# Patient Record
Sex: MALE | Race: WHITE | Employment: FULL TIME | URBAN - METROPOLITAN AREA
[De-identification: names, ages, dates, MRNs, and addresses within clinical notes are randomized per-mention and may not be internally consistent; named-entity substitution may affect disease eponyms.]

---

## 2019-03-24 ENCOUNTER — HOSPITAL ENCOUNTER (INPATIENT)
Age: 39
LOS: 1 days | Discharge: HOME | DRG: 282 | End: 2019-03-26
Attending: EMERGENCY MEDICINE | Admitting: INTERNAL MEDICINE
Payer: COMMERCIAL

## 2019-03-24 ENCOUNTER — APPOINTMENT (OUTPATIENT)
Dept: CT IMAGING | Age: 39
DRG: 282 | End: 2019-03-24
Attending: EMERGENCY MEDICINE
Payer: COMMERCIAL

## 2019-03-24 ENCOUNTER — APPOINTMENT (OUTPATIENT)
Dept: GENERAL RADIOLOGY | Age: 39
DRG: 282 | End: 2019-03-24
Attending: EMERGENCY MEDICINE
Payer: COMMERCIAL

## 2019-03-24 ENCOUNTER — APPOINTMENT (OUTPATIENT)
Dept: ULTRASOUND IMAGING | Age: 39
DRG: 282 | End: 2019-03-24
Attending: INTERNAL MEDICINE
Payer: COMMERCIAL

## 2019-03-24 DIAGNOSIS — R07.9 CHEST PAIN, UNSPECIFIED TYPE: Primary | ICD-10-CM

## 2019-03-24 DIAGNOSIS — I21.4 NSTEMI (NON-ST ELEVATED MYOCARDIAL INFARCTION) (HCC): ICD-10-CM

## 2019-03-24 DIAGNOSIS — R77.8 ELEVATED TROPONIN I LEVEL: ICD-10-CM

## 2019-03-24 LAB
ALBUMIN SERPL-MCNC: 3.7 G/DL (ref 3.5–5)
ALBUMIN/GLOB SERPL: 0.8 {RATIO} (ref 1.1–2.2)
ALP SERPL-CCNC: 79 U/L (ref 45–117)
ALT SERPL-CCNC: 96 U/L (ref 12–78)
AMPHET UR QL SCN: NEGATIVE
ANION GAP SERPL CALC-SCNC: 8 MMOL/L (ref 5–15)
APTT PPP: 27.7 SEC (ref 22.1–32)
AST SERPL-CCNC: 46 U/L (ref 15–37)
ATRIAL RATE: 89 BPM
BARBITURATES UR QL SCN: NEGATIVE
BASOPHILS # BLD: 0 K/UL (ref 0–0.1)
BASOPHILS NFR BLD: 0 % (ref 0–1)
BENZODIAZ UR QL: NEGATIVE
BILIRUB DIRECT SERPL-MCNC: 0.1 MG/DL (ref 0–0.2)
BILIRUB SERPL-MCNC: 0.9 MG/DL (ref 0.2–1)
BUN SERPL-MCNC: 13 MG/DL (ref 6–20)
BUN/CREAT SERPL: 13 (ref 12–20)
CALCIUM SERPL-MCNC: 9 MG/DL (ref 8.5–10.1)
CALCULATED P AXIS, ECG09: 49 DEGREES
CALCULATED R AXIS, ECG10: 2 DEGREES
CALCULATED T AXIS, ECG11: 39 DEGREES
CANNABINOIDS UR QL SCN: NEGATIVE
CHLORIDE SERPL-SCNC: 100 MMOL/L (ref 97–108)
CK SERPL-CCNC: 125 U/L (ref 39–308)
CO2 SERPL-SCNC: 29 MMOL/L (ref 21–32)
COCAINE UR QL SCN: NEGATIVE
COMMENT, HOLDF: NORMAL
CREAT SERPL-MCNC: 1.04 MG/DL (ref 0.7–1.3)
D DIMER PPP FEU-MCNC: 0.85 MG/L FEU (ref 0–0.65)
DIAGNOSIS, 93000: NORMAL
DIFFERENTIAL METHOD BLD: NORMAL
DRUG SCRN COMMENT,DRGCM: NORMAL
EOSINOPHIL # BLD: 0.1 K/UL (ref 0–0.4)
EOSINOPHIL NFR BLD: 1 % (ref 0–7)
ERYTHROCYTE [DISTWIDTH] IN BLOOD BY AUTOMATED COUNT: 12.5 % (ref 11.5–14.5)
GLOBULIN SER CALC-MCNC: 4.6 G/DL (ref 2–4)
GLUCOSE SERPL-MCNC: 101 MG/DL (ref 65–100)
HCT VFR BLD AUTO: 46.6 % (ref 36.6–50.3)
HGB BLD-MCNC: 15.7 G/DL (ref 12.1–17)
IMM GRANULOCYTES # BLD AUTO: 0 K/UL (ref 0–0.04)
IMM GRANULOCYTES NFR BLD AUTO: 0 % (ref 0–0.5)
LIPASE SERPL-CCNC: 172 U/L (ref 73–393)
LYMPHOCYTES # BLD: 2 K/UL (ref 0.8–3.5)
LYMPHOCYTES NFR BLD: 21 % (ref 12–49)
MAGNESIUM SERPL-MCNC: 2 MG/DL (ref 1.6–2.4)
MCH RBC QN AUTO: 31.2 PG (ref 26–34)
MCHC RBC AUTO-ENTMCNC: 33.7 G/DL (ref 30–36.5)
MCV RBC AUTO: 92.6 FL (ref 80–99)
METHADONE UR QL: NEGATIVE
MONOCYTES # BLD: 1 K/UL (ref 0–1)
MONOCYTES NFR BLD: 10 % (ref 5–13)
NEUTS SEG # BLD: 6.3 K/UL (ref 1.8–8)
NEUTS SEG NFR BLD: 68 % (ref 32–75)
NRBC # BLD: 0 K/UL (ref 0–0.01)
NRBC BLD-RTO: 0 PER 100 WBC
OPIATES UR QL: NEGATIVE
P-R INTERVAL, ECG05: 134 MS
PCP UR QL: NEGATIVE
PLATELET # BLD AUTO: 253 K/UL (ref 150–400)
PMV BLD AUTO: 11 FL (ref 8.9–12.9)
POTASSIUM SERPL-SCNC: 4.6 MMOL/L (ref 3.5–5.1)
PROT SERPL-MCNC: 8.3 G/DL (ref 6.4–8.2)
Q-T INTERVAL, ECG07: 362 MS
QRS DURATION, ECG06: 82 MS
QTC CALCULATION (BEZET), ECG08: 440 MS
RBC # BLD AUTO: 5.03 M/UL (ref 4.1–5.7)
SAMPLES BEING HELD,HOLD: NORMAL
SODIUM SERPL-SCNC: 137 MMOL/L (ref 136–145)
T4 FREE SERPL-MCNC: 0.9 NG/DL (ref 0.8–1.5)
THERAPEUTIC RANGE,PTTT: NORMAL SECS (ref 58–77)
TROPONIN I SERPL-MCNC: 0.06 NG/ML
TROPONIN I SERPL-MCNC: 1.07 NG/ML
TSH SERPL DL<=0.05 MIU/L-ACNC: 0.91 UIU/ML (ref 0.36–3.74)
UR CULT HOLD, URHOLD: NORMAL
VENTRICULAR RATE, ECG03: 89 BPM
WBC # BLD AUTO: 9.4 K/UL (ref 4.1–11.1)

## 2019-03-24 PROCEDURE — 74011000250 HC RX REV CODE- 250: Performed by: SPECIALIST

## 2019-03-24 PROCEDURE — 74011636320 HC RX REV CODE- 636/320: Performed by: EMERGENCY MEDICINE

## 2019-03-24 PROCEDURE — 99218 HC RM OBSERVATION: CPT

## 2019-03-24 PROCEDURE — 83690 ASSAY OF LIPASE: CPT

## 2019-03-24 PROCEDURE — 74011250637 HC RX REV CODE- 250/637: Performed by: INTERNAL MEDICINE

## 2019-03-24 PROCEDURE — 84484 ASSAY OF TROPONIN QUANT: CPT

## 2019-03-24 PROCEDURE — 76705 ECHO EXAM OF ABDOMEN: CPT

## 2019-03-24 PROCEDURE — 93005 ELECTROCARDIOGRAM TRACING: CPT

## 2019-03-24 PROCEDURE — 71275 CT ANGIOGRAPHY CHEST: CPT

## 2019-03-24 PROCEDURE — 83735 ASSAY OF MAGNESIUM: CPT

## 2019-03-24 PROCEDURE — 74011250636 HC RX REV CODE- 250/636: Performed by: EMERGENCY MEDICINE

## 2019-03-24 PROCEDURE — 80048 BASIC METABOLIC PNL TOTAL CA: CPT

## 2019-03-24 PROCEDURE — 85379 FIBRIN DEGRADATION QUANT: CPT

## 2019-03-24 PROCEDURE — 96374 THER/PROPH/DIAG INJ IV PUSH: CPT

## 2019-03-24 PROCEDURE — 74011000250 HC RX REV CODE- 250: Performed by: EMERGENCY MEDICINE

## 2019-03-24 PROCEDURE — 71046 X-RAY EXAM CHEST 2 VIEWS: CPT

## 2019-03-24 PROCEDURE — 74011250636 HC RX REV CODE- 250/636: Performed by: SPECIALIST

## 2019-03-24 PROCEDURE — 84443 ASSAY THYROID STIM HORMONE: CPT

## 2019-03-24 PROCEDURE — 99284 EMERGENCY DEPT VISIT MOD MDM: CPT

## 2019-03-24 PROCEDURE — 82550 ASSAY OF CK (CPK): CPT

## 2019-03-24 PROCEDURE — 80307 DRUG TEST PRSMV CHEM ANLYZR: CPT

## 2019-03-24 PROCEDURE — 85730 THROMBOPLASTIN TIME PARTIAL: CPT

## 2019-03-24 PROCEDURE — 84439 ASSAY OF FREE THYROXINE: CPT

## 2019-03-24 PROCEDURE — 94762 N-INVAS EAR/PLS OXIMTRY CONT: CPT

## 2019-03-24 PROCEDURE — 96375 TX/PRO/DX INJ NEW DRUG ADDON: CPT

## 2019-03-24 PROCEDURE — 74011250637 HC RX REV CODE- 250/637: Performed by: EMERGENCY MEDICINE

## 2019-03-24 PROCEDURE — 96361 HYDRATE IV INFUSION ADD-ON: CPT

## 2019-03-24 PROCEDURE — 74011250637 HC RX REV CODE- 250/637: Performed by: SPECIALIST

## 2019-03-24 PROCEDURE — 85025 COMPLETE CBC W/AUTO DIFF WBC: CPT

## 2019-03-24 PROCEDURE — 36415 COLL VENOUS BLD VENIPUNCTURE: CPT

## 2019-03-24 PROCEDURE — 80076 HEPATIC FUNCTION PANEL: CPT

## 2019-03-24 PROCEDURE — 74011250636 HC RX REV CODE- 250/636: Performed by: INTERNAL MEDICINE

## 2019-03-24 RX ORDER — SODIUM CHLORIDE 9 MG/ML
50 INJECTION, SOLUTION INTRAVENOUS
Status: COMPLETED | OUTPATIENT
Start: 2019-03-24 | End: 2019-03-24

## 2019-03-24 RX ORDER — SODIUM CHLORIDE 0.9 % (FLUSH) 0.9 %
10 SYRINGE (ML) INJECTION
Status: COMPLETED | OUTPATIENT
Start: 2019-03-24 | End: 2019-03-24

## 2019-03-24 RX ORDER — GUAIFENESIN 100 MG/5ML
162 LIQUID (ML) ORAL
Status: COMPLETED | OUTPATIENT
Start: 2019-03-24 | End: 2019-03-24

## 2019-03-24 RX ORDER — ROSUVASTATIN CALCIUM 10 MG/1
20 TABLET, COATED ORAL
Status: DISCONTINUED | OUTPATIENT
Start: 2019-03-24 | End: 2019-03-26

## 2019-03-24 RX ORDER — SODIUM CHLORIDE 0.9 % (FLUSH) 0.9 %
5-40 SYRINGE (ML) INJECTION AS NEEDED
Status: DISCONTINUED | OUTPATIENT
Start: 2019-03-24 | End: 2019-03-26 | Stop reason: HOSPADM

## 2019-03-24 RX ORDER — BISMUTH SUBSALICYLATE 262 MG
1 TABLET,CHEWABLE ORAL DAILY
COMMUNITY

## 2019-03-24 RX ORDER — FOLIC ACID 1 MG/1
1 TABLET ORAL DAILY
Status: DISCONTINUED | OUTPATIENT
Start: 2019-03-25 | End: 2019-03-24 | Stop reason: SDUPTHER

## 2019-03-24 RX ORDER — NITROGLYCERIN 20 MG/100ML
0-200 INJECTION INTRAVENOUS
Status: DISCONTINUED | OUTPATIENT
Start: 2019-03-24 | End: 2019-03-25

## 2019-03-24 RX ORDER — FAMOTIDINE 20 MG/1
20 TABLET, FILM COATED ORAL 2 TIMES DAILY
Status: DISCONTINUED | OUTPATIENT
Start: 2019-03-24 | End: 2019-03-26 | Stop reason: HOSPADM

## 2019-03-24 RX ORDER — METOPROLOL TARTRATE 25 MG/1
25 TABLET, FILM COATED ORAL EVERY 6 HOURS
Status: DISCONTINUED | OUTPATIENT
Start: 2019-03-24 | End: 2019-03-26

## 2019-03-24 RX ORDER — HEPARIN SODIUM 10000 [USP'U]/100ML
8-25 INJECTION, SOLUTION INTRAVENOUS
Status: DISCONTINUED | OUTPATIENT
Start: 2019-03-24 | End: 2019-03-25

## 2019-03-24 RX ORDER — RANITIDINE 150 MG/1
150 TABLET, FILM COATED ORAL 2 TIMES DAILY
COMMUNITY
End: 2019-03-26

## 2019-03-24 RX ORDER — NITROGLYCERIN 0.4 MG/1
0.4 TABLET SUBLINGUAL
Status: DISCONTINUED | OUTPATIENT
Start: 2019-03-24 | End: 2019-03-26 | Stop reason: HOSPADM

## 2019-03-24 RX ORDER — LANOLIN ALCOHOL/MO/W.PET/CERES
100 CREAM (GRAM) TOPICAL DAILY
Status: DISCONTINUED | OUTPATIENT
Start: 2019-03-24 | End: 2019-03-26 | Stop reason: HOSPADM

## 2019-03-24 RX ORDER — HEPARIN SODIUM 5000 [USP'U]/ML
4000 INJECTION, SOLUTION INTRAVENOUS; SUBCUTANEOUS ONCE
Status: COMPLETED | OUTPATIENT
Start: 2019-03-24 | End: 2019-03-24

## 2019-03-24 RX ORDER — MORPHINE SULFATE 2 MG/ML
2 INJECTION, SOLUTION INTRAMUSCULAR; INTRAVENOUS
Status: DISCONTINUED | OUTPATIENT
Start: 2019-03-24 | End: 2019-03-26 | Stop reason: HOSPADM

## 2019-03-24 RX ORDER — ONDANSETRON 2 MG/ML
4 INJECTION INTRAMUSCULAR; INTRAVENOUS
Status: DISCONTINUED | OUTPATIENT
Start: 2019-03-24 | End: 2019-03-26 | Stop reason: HOSPADM

## 2019-03-24 RX ORDER — LORAZEPAM 2 MG/ML
2 INJECTION INTRAMUSCULAR
Status: DISCONTINUED | OUTPATIENT
Start: 2019-03-24 | End: 2019-03-26 | Stop reason: HOSPADM

## 2019-03-24 RX ORDER — METOPROLOL TARTRATE 25 MG/1
25 TABLET, FILM COATED ORAL EVERY 12 HOURS
Status: DISCONTINUED | OUTPATIENT
Start: 2019-03-24 | End: 2019-03-24

## 2019-03-24 RX ORDER — KETOROLAC TROMETHAMINE 30 MG/ML
15 INJECTION, SOLUTION INTRAMUSCULAR; INTRAVENOUS
Status: COMPLETED | OUTPATIENT
Start: 2019-03-24 | End: 2019-03-24

## 2019-03-24 RX ORDER — LANOLIN ALCOHOL/MO/W.PET/CERES
100 CREAM (GRAM) TOPICAL DAILY
Status: DISCONTINUED | OUTPATIENT
Start: 2019-03-25 | End: 2019-03-24 | Stop reason: SDUPTHER

## 2019-03-24 RX ORDER — SODIUM CHLORIDE 0.9 % (FLUSH) 0.9 %
5-40 SYRINGE (ML) INJECTION EVERY 8 HOURS
Status: DISCONTINUED | OUTPATIENT
Start: 2019-03-24 | End: 2019-03-26 | Stop reason: HOSPADM

## 2019-03-24 RX ORDER — FOLIC ACID 1 MG/1
1 TABLET ORAL DAILY
Status: DISCONTINUED | OUTPATIENT
Start: 2019-03-24 | End: 2019-03-26 | Stop reason: HOSPADM

## 2019-03-24 RX ORDER — ASPIRIN 325 MG
325 TABLET ORAL DAILY
Status: DISCONTINUED | OUTPATIENT
Start: 2019-03-25 | End: 2019-03-26

## 2019-03-24 RX ORDER — OXYCODONE AND ACETAMINOPHEN 5; 325 MG/1; MG/1
1-2 TABLET ORAL
Status: DISCONTINUED | OUTPATIENT
Start: 2019-03-24 | End: 2019-03-26 | Stop reason: HOSPADM

## 2019-03-24 RX ORDER — ACETAMINOPHEN 325 MG/1
650 TABLET ORAL
Status: DISCONTINUED | OUTPATIENT
Start: 2019-03-24 | End: 2019-03-26 | Stop reason: HOSPADM

## 2019-03-24 RX ADMIN — FAMOTIDINE 20 MG: 20 TABLET ORAL at 18:00

## 2019-03-24 RX ADMIN — ASPIRIN 81 MG CHEWABLE TABLET 162 MG: 81 TABLET CHEWABLE at 11:01

## 2019-03-24 RX ADMIN — OXYCODONE AND ACETAMINOPHEN 1 TABLET: 5; 325 TABLET ORAL at 20:33

## 2019-03-24 RX ADMIN — Medication 10 ML: at 23:21

## 2019-03-24 RX ADMIN — MORPHINE SULFATE 2 MG: 2 INJECTION, SOLUTION INTRAMUSCULAR; INTRAVENOUS at 23:21

## 2019-03-24 RX ADMIN — FAMOTIDINE 20 MG: 10 INJECTION, SOLUTION INTRAVENOUS at 09:55

## 2019-03-24 RX ADMIN — LIDOCAINE HYDROCHLORIDE 40 ML: 20 SOLUTION ORAL; TOPICAL at 09:55

## 2019-03-24 RX ADMIN — IOPAMIDOL 100 ML: 755 INJECTION, SOLUTION INTRAVENOUS at 10:41

## 2019-03-24 RX ADMIN — FAMOTIDINE 20 MG: 20 TABLET ORAL at 14:00

## 2019-03-24 RX ADMIN — ROSUVASTATIN CALCIUM 20 MG: 10 TABLET, FILM COATED ORAL at 23:21

## 2019-03-24 RX ADMIN — NITROGLYCERIN 10 MCG/MIN: 20 INJECTION INTRAVENOUS at 19:35

## 2019-03-24 RX ADMIN — METOPROLOL TARTRATE 25 MG: 25 TABLET ORAL at 23:21

## 2019-03-24 RX ADMIN — HEPARIN SODIUM 4000 UNITS: 5000 INJECTION, SOLUTION INTRAVENOUS; SUBCUTANEOUS at 19:30

## 2019-03-24 RX ADMIN — METOPROLOL TARTRATE 25 MG: 25 TABLET ORAL at 16:00

## 2019-03-24 RX ADMIN — SODIUM CHLORIDE 1000 ML: 900 INJECTION, SOLUTION INTRAVENOUS at 09:52

## 2019-03-24 RX ADMIN — MORPHINE SULFATE 2 MG: 2 INJECTION, SOLUTION INTRAMUSCULAR; INTRAVENOUS at 17:52

## 2019-03-24 RX ADMIN — KETOROLAC TROMETHAMINE 15 MG: 30 INJECTION, SOLUTION INTRAMUSCULAR; INTRAVENOUS at 12:00

## 2019-03-24 RX ADMIN — METOPROLOL TARTRATE 25 MG: 25 TABLET ORAL at 19:39

## 2019-03-24 RX ADMIN — Medication 10 ML: at 10:41

## 2019-03-24 RX ADMIN — SODIUM CHLORIDE 50 ML/HR: 900 INJECTION, SOLUTION INTRAVENOUS at 10:41

## 2019-03-24 RX ADMIN — Medication 10 ML: at 17:52

## 2019-03-24 RX ADMIN — MULTIPLE VITAMINS W/ MINERALS TAB 1 TABLET: TAB at 14:00

## 2019-03-24 RX ADMIN — Medication 100 MG: at 16:00

## 2019-03-24 RX ADMIN — HEPARIN SODIUM AND DEXTROSE 8 UNITS/KG/HR: 10000; 5 INJECTION INTRAVENOUS at 19:21

## 2019-03-24 RX ADMIN — FOLIC ACID 1 MG: 1 TABLET ORAL at 16:00

## 2019-03-24 NOTE — ED PROVIDER NOTES
75-year-old white male presents to the emergency department with chest pain. Patient reports that he's had a long history of acid reflux. He reports that he takes Zantac twice a day. He has had many years of acid reflux. Patient reports that she had a big meal with wine 2 nights ago. He says he had a hard time sleeping afterwards because of chest pain. He felt burning in the center of his chest that radiated to his throat. This seemed to be worse with lying flat. He did not sleep most of the night that night. Yesterday he was having continued pain in the upper abdomen and lower chest. He felt like it was worse when he lies flat. [facility]. He has had one dose of Nexium. He tried TUMS and Maalox yesterday without relief. Patient is here today with continued pain in the upper abdomen and lower chest. Pain is dull in nature and feels like a burning. Pain is different from his reflux pain. Pt feels left chest pain radiating to his neck and worse w/ deep breaths. He says he feels pain in his neck with deep breaths. No vomiting. No lower abdominal pain. No diarrhea. No dysuria or hematuria. No headaches. Patient denies tobacco or alcohol use every day. No previous cardiac history. No long car trips or plane rides. No family history of cardiac disease. History reviewed. No pertinent past medical history. History reviewed. No pertinent surgical history. History reviewed. No pertinent family history. Social History Socioeconomic History  Marital status: Not on file Spouse name: Not on file  Number of children: Not on file  Years of education: Not on file  Highest education level: Not on file Occupational History  Not on file Social Needs  Financial resource strain: Not on file  Food insecurity:  
  Worry: Not on file Inability: Not on file  Transportation needs:  
  Medical: Not on file Non-medical: Not on file Tobacco Use  Smoking status: Not on file Substance and Sexual Activity  Alcohol use: Not on file  Drug use: Not on file  Sexual activity: Not on file Lifestyle  Physical activity:  
  Days per week: Not on file Minutes per session: Not on file  Stress: Not on file Relationships  Social connections:  
  Talks on phone: Not on file Gets together: Not on file Attends Advent service: Not on file Active member of club or organization: Not on file Attends meetings of clubs or organizations: Not on file Relationship status: Not on file  Intimate partner violence:  
  Fear of current or ex partner: Not on file Emotionally abused: Not on file Physically abused: Not on file Forced sexual activity: Not on file Other Topics Concern  Not on file Social History Narrative  Not on file ALLERGIES: Patient has no known allergies. Review of Systems Constitutional: Negative for fever. HENT: Negative for congestion. Eyes: Negative for pain. Respiratory: Negative for cough and shortness of breath. Cardiovascular: Positive for chest pain. Negative for leg swelling. Gastrointestinal: Positive for abdominal pain. Negative for diarrhea and vomiting. Endocrine: Negative for polyuria. Genitourinary: Negative for flank pain. Musculoskeletal: Negative for gait problem, neck pain and neck stiffness. Skin: Negative for color change. Allergic/Immunologic: Negative for immunocompromised state. Neurological: Negative for headaches. Hematological: Does not bruise/bleed easily. Psychiatric/Behavioral: Negative for confusion. All other systems reviewed and are negative. There were no vitals filed for this visit. Physical Exam  
Constitutional: He is oriented to person, place, and time. He appears well-developed and well-nourished. No distress. HENT:  
Head: Normocephalic and atraumatic. Right Ear: External ear normal.  
Left Ear: External ear normal.  
Eyes: Pupils are equal, round, and reactive to light. EOM are normal.  
Neck: Normal range of motion. Neck supple. No JVD present. No tracheal deviation present. Cardiovascular: Normal rate, regular rhythm and normal heart sounds. Exam reveals no gallop and no friction rub. No murmur heard. Pulmonary/Chest: Effort normal and breath sounds normal. No stridor. No respiratory distress. He has no wheezes. He has no rales. Abdominal: Soft. Bowel sounds are normal. He exhibits no distension. There is no tenderness. There is no rebound and no guarding. Musculoskeletal: Normal range of motion. He exhibits no edema or tenderness. Neurological: He is alert and oriented to person, place, and time. He has normal reflexes. No cranial nerve deficit or sensory deficit. He exhibits normal muscle tone. Coordination normal.  
Skin: Skin is warm and dry. No rash noted. He is not diaphoretic. No erythema. Psychiatric: He has a normal mood and affect. His behavior is normal. Judgment and thought content normal.  
Nursing note and vitals reviewed. MDM Number of Diagnoses or Management Options Diagnosis management comments: Patient currently looks well and nontoxic. Differential diagnosis includes ACS, PE, acid reflux. We'll check basic blood work. We'll check EKG. We'll check chest x-ray. We'll give GI cocktail and IV fluids. We'll reassess when testing is completed. Patient agrees. Amount and/or Complexity of Data Reviewed Decide to obtain previous medical records or to obtain history from someone other than the patient: yes Review and summarize past medical records: yes Independent visualization of images, tracings, or specimens: yes Procedures EKG: NSR, HR 89, normal axis, normal intervals, no ST elevations or depressions Memory MD Sathya 
 
 Trop is 0.06 
 
D-dimer is slightly elevated, will obtain chest CT 
 
 Because of elevated Trop, and symptoms which are different from his reflux pain, will admit for further cardiac evalaution 11:05 AM 
CONSULT Hospitalist Dr Urmila Santana was contacted by tiger chelo. He accepts for admission

## 2019-03-24 NOTE — CONSULTS
3/24/2019    Cardiology Consult  Note   [facility]   --------PCP:-Harika Gutierrez MD   -----Subjective:   Marianna Hook is a 34 y.o. male  Consult requested from Hospitalist/Internal Medicine team  for chest pain  Called by nurse Toyin Elizabeth to see pt  He is with his wife who works as nurse at [employer] [facility] [employer] urgent care  Ela Myers denies cardiac hx, takes some GERD med and Zyrtec and sees Maru Teresa MD   Around [phone] am Saturday he felt onset of pain in mid sternum along esophagus to right shoulder  Then 1230 am Sunday today , felt same symptoms, hard to sleep for few hours  Then when awoke , pain in right and left shoulders now , went to [facility]  First troponin was low at 0.06 and another is pending right now  He is to have a abdominal US tonight  Had GI bug 2 weeks ago, no recent flu  ROS:Cardiac as above. Respiratory as above with no wheezing or hemoptysis. ROS negative x as above   He denies  symptoms of unusual weight loss , fevers, night sweats, BRBPR, hematuria, seizure or recent stroke  Denies CVA, HTN, DM2 of XOL  CTA done at [facility] showed NO PE, + minor atelectasis   Social Hx-travels once a week for business, 3 kids, non smoker, no long travel of late  Family Hx-father with CHF in his 62s, no CAD  Discussion/Plan/Impression:    Diffuse pain to shoulders and chest , at first seemed GERD but now constant and seems more musculoskeletal   EKG with NSR and minor lateral STT findings  All very non specific  If enzymes low then stress nuclear and echo in am  If enzymes positive then heparin and cath in am  Discussed fully with patient and wife who is a nurse and familiar with troponin etc  They concur with plan       Lab Results   Component Value Date/Time    Creatinine 1.04 03/24/2019 09:47 AM          Cardiac Studies/Hx:  No specialty comments available. Medical history notable for  has no past medical history on file.   Social history notable for  reports that he has quit smoking. He has never used smokeless tobacco. He reports that he drinks alcohol. He reports that he does not use drugs. Family history notable for family history is not on file. History reviewed. No pertinent past medical history. ROS-pertinents  negative except as above  The pertinent portions of the medical history,physician and nursing notes, meds,vitals , labs and Ins/Outs,are reviewed in the electronic record  Pt reports   CP and the remainder of a complete multisystem 11 ROS  Are reviewed and negative    Social History     Tobacco Use    Smoking status: Former Smoker    Smokeless tobacco: Never Used   Substance Use Topics    Alcohol use: Yes     Comment: 4 nights a week    Drug use: Never      History reviewed. No pertinent family history. Prior to Admission Medications   Prescriptions Last Dose Informant Patient Reported? Taking? Cetirizine (ZYRTEC) 10 mg cap 3/23/2019 at Unknown time Self Yes Yes   Sig: Take 5 mg by mouth.   multivitamin (ONE A DAY) tablet 3/17/2019 at Unknown time  Yes Yes   Sig: Take 1 Tab by mouth daily. raNITIdine (ZANTAC) 150 mg tablet 3/24/2019 at Unknown time Self Yes Yes   Sig: Take 150 mg by mouth two (2) times a day.       Facility-Administered Medications: None     No Known Allergies   Objective:    Physical Exam:   Patient Vitals for the past 12 hrs:   Temp Pulse Resp BP SpO2   03/24/19 1736 99.5 °F (37.5 °C) 93 18 128/75 96 %   03/24/19 1507 99 °F (37.2 °C) (!) 105 18 141/89 95 %   03/24/19 1241 98.7 °F (37.1 °C) (!) 108 18 (!) 160/93    03/24/19 1130 97.8 °F (36.6 °C) 86  (!) 157/113 98 %   03/24/19 1100  90  (!) 165/103 98 %   03/24/19 1027  85 23 (!) 151/100 98 %   03/24/19 0939  88 21 (!) 147/103       General:  alert, cooperative, no distress, appears stated age   [de-identified], Neck:  NC,AT- no JVD   Chest Wall: inspection normal - no chest wall deformities or tenderness, respiratory effort normal   Lung:   clear to auscultation bilaterally   Heart:    normal rate, regular rhythm, normal S1, S2, no murmurs, rubs, clicks or gallops   Abdomen: nondistended   Extremities: extremities normal, atraumatic, no cyanosis or edema     Last 24hr Input/Output:    Intake/Output Summary (Last 24 hours) at 3/24/2019 1803  Last data filed at 3/24/2019 1111  Gross per 24 hour   Intake 1050 ml   Output    Net 1050 ml        Data Review:   Recent Results (from the past 24 hour(s))   EKG, 12 LEAD, INITIAL    Collection Time: 03/24/19  9:40 AM   Result Value Ref Range    Ventricular Rate 89 BPM    Atrial Rate 89 BPM    P-R Interval 134 ms    QRS Duration 82 ms    Q-T Interval 362 ms    QTC Calculation (Bezet) 440 ms    Calculated P Axis 49 degrees    Calculated R Axis 2 degrees    Calculated T Axis 39 degrees    Diagnosis       Normal sinus rhythm  Within normal limits  No previous ECGs available  Confirmed by Remington Christopher MD. ([id number]) on 3/24/2019 4:07:24 PM     SAMPLES BEING HELD    Collection Time: 03/24/19  9:47 AM   Result Value Ref Range    SAMPLES BEING HELD 1RED     COMMENT        Add-on orders for these samples will be processed based on acceptable specimen integrity and analyte stability, which may vary by analyte. CBC WITH AUTOMATED DIFF    Collection Time: 03/24/19  9:47 AM   Result Value Ref Range    WBC 9.4 4.1 - 11.1 K/uL    RBC 5.03 4. 10 - 5.70 M/uL    HGB 15.7 12.1 - 17.0 g/dL    HCT 46.6 36.6 - 50.3 %    MCV 92.6 80.0 - 99.0 FL    MCH 31.2 26.0 - 34.0 PG    MCHC 33.7 30.0 - 36.5 g/dL    RDW 12.5 11.5 - 14.5 %    PLATELET 658 359 - 414 K/uL    MPV 11.0 8.9 - 12.9 FL    NRBC 0.0 0  WBC    ABSOLUTE NRBC 0.00 0.00 - 0.01 K/uL    NEUTROPHILS 68 32 - 75 %    LYMPHOCYTES 21 12 - 49 %    MONOCYTES 10 5 - 13 %    EOSINOPHILS 1 0 - 7 %    BASOPHILS 0 0 - 1 %    IMMATURE GRANULOCYTES 0 0.0 - 0.5 %    ABS. NEUTROPHILS 6.3 1.8 - 8.0 K/UL    ABS. LYMPHOCYTES 2.0 0.8 - 3.5 K/UL    ABS. MONOCYTES 1.0 0.0 - 1.0 K/UL    ABS. EOSINOPHILS 0.1 0.0 - 0.4 K/UL    ABS. BASOPHILS 0.0 0.0 - 0.1 K/UL    ABS. IMM. GRANS. 0.0 0.00 - 0.04 K/UL    DF AUTOMATED     CK W/ REFLX CKMB    Collection Time: 03/24/19  9:47 AM   Result Value Ref Range     39 - 308 U/L   HEPATIC FUNCTION PANEL    Collection Time: 03/24/19  9:47 AM   Result Value Ref Range    Protein, total 8.3 (H) 6.4 - 8.2 g/dL    Albumin 3.7 3.5 - 5.0 g/dL    Globulin 4.6 (H) 2.0 - 4.0 g/dL    A-G Ratio 0.8 (L) 1.1 - 2.2      Bilirubin, total 0.9 0.2 - 1.0 MG/DL    Bilirubin, direct 0.1 0.0 - 0.2 MG/DL    Alk.  phosphatase 79 45 - 117 U/L    AST (SGOT) 46 (H) 15 - 37 U/L    ALT (SGPT) 96 (H) 12 - 78 U/L   LIPASE    Collection Time: 03/24/19  9:47 AM   Result Value Ref Range    Lipase 172 73 - 393 U/L   TROPONIN I    Collection Time: 03/24/19  9:47 AM   Result Value Ref Range    Troponin-I, Qt. 0.06 (H) <7.79 ng/mL   METABOLIC PANEL, BASIC    Collection Time: 03/24/19  9:47 AM   Result Value Ref Range    Sodium 137 136 - 145 mmol/L    Potassium 4.6 3.5 - 5.1 mmol/L    Chloride 100 97 - 108 mmol/L    CO2 29 21 - 32 mmol/L    Anion gap 8 5 - 15 mmol/L    Glucose 101 (H) 65 - 100 mg/dL    BUN 13 6 - 20 MG/DL    Creatinine 1.04 0.70 - 1.30 MG/DL    BUN/Creatinine ratio 13 12 - 20      GFR est AA >60 >60 ml/min/1.73m2    GFR est non-AA >60 >60 ml/min/1.73m2    Calcium 9.0 8.5 - 10.1 MG/DL   D DIMER    Collection Time: 03/24/19  9:47 AM   Result Value Ref Range    D-dimer 0.85 (H) 0.00 - 0.65 mg/L FEU   DRUG SCREEN, URINE    Collection Time: 03/24/19 12:01 PM   Result Value Ref Range    AMPHETAMINES NEGATIVE  NEG      BARBITURATES NEGATIVE  NEG      BENZODIAZEPINES NEGATIVE  NEG      COCAINE NEGATIVE  NEG      METHADONE NEGATIVE  NEG      OPIATES NEGATIVE  NEG      PCP(PHENCYCLIDINE) NEGATIVE  NEG      THC (TH-CANNABINOL) NEGATIVE  NEG      Drug screen comment (NOTE)    URINE CULTURE HOLD SAMPLE    Collection Time: 03/24/19 12:01 PM   Result Value Ref Range    Urine culture hold        URINE ON HOLD IN MICROBIOLOGY DEPT FOR 3 DAYS. IF UNPRESERVED URINE IS SUBMITTED, IT CANNOT BE USED FOR ADDITIONAL TESTING AFTER 24 HRS, RECOLLECTION WILL BE REQUIRED.    EKG, 12 LEAD, INITIAL    Collection Time: 03/24/19  3:02 PM   Result Value Ref Range    Ventricular Rate 107 BPM    Atrial Rate 107 BPM    P-R Interval 124 ms    QRS Duration 90 ms    Q-T Interval 344 ms    QTC Calculation (Bezet) 459 ms    Calculated P Axis 32 degrees    Calculated R Axis -8 degrees    Calculated T Axis 21 degrees    Diagnosis       Sinus tachycardia  Moderate voltage criteria for LVH, may be normal variant  When compared with ECG of 24-MAR-2019 09:40,  MANUAL COMPARISON REQUIRED, DATA IS UNCONFIRMED     PTT    Collection Time: 03/24/19  3:49 PM   Result Value Ref Range    aPTT 27.7 22.1 - 32.0 sec    aPTT, therapeutic range     58.0 - 77.0 SECS      No results found for: CHOL, CHOLX, CHLST, [address], [id number], HDL, LDL, LDLC, DLDLP, TGLX, TRIGL, TRIGP, CHHD, Perfecto Pierre MD 3/24/2019

## 2019-03-24 NOTE — ED NOTES
MADINA in department to transport patient to [facility]. Patient medicated for pain with 15 mg IV Toradol prior to transfer for 7/10 LEFT shoulder pain. No change in patient status. Remains hypertensive. Per MD would not like to redraw repeat Troponin at this time. No distress noted with patients departure from department.

## 2019-03-24 NOTE — ED NOTES
MADINA called for transport to [facility] 'within the hour'.

## 2019-03-24 NOTE — PROGRESS NOTES
Second troponin shows higher at 1.07 So despite atypical symptoms , this seems to be NQMI Will start NTG drip, heparin drip and try lessen cp May need CCU Plan cath and PCI in am

## 2019-03-24 NOTE — PROGRESS NOTES
Rayne Bosworth called from [facility] ER with report. Transport within the hour per Rayne Bosworth.

## 2019-03-24 NOTE — ED TRIAGE NOTES
TRIAGE NOTES:  
Pt c/o severe pain from neck to left shoulder. Friday night had a lot of heart burn from a large meal, but woken up at night with severe pain since then. Hurts to lay down. [facility] prescribed Nexium, but it did nothing. Pt denies SOB, N/V. Pt complains of pain taking deep breaths in his neck. Pain: 8; consistent, aching

## 2019-03-24 NOTE — PROGRESS NOTES
1241 Patient arrived to unit, notified provider of arrival.  
 
1530 Ekg, Ptt, and Troponin drawn. Paged Dr Joyce Mondragon for medications to manage pain. Cardiology consult called. 1600 Patient NPO for US of ABD 
 
1838 Troponin resulted, paged Dr Shannan Macario and updated on patient status. Orders to follow.

## 2019-03-24 NOTE — ED NOTES
Patient reports esophageal pain has decreased to 0/10 after GI cocktails, however reports continued LEFT shoulder pain 7/10. Aware of plan for CTA. Encouraged patient to maintain arm in straight position to promote IV fluid administration. Denies further needs at this time. Remains on CM x 3.

## 2019-03-24 NOTE — H&P
Hospitalist History and Physical 
Linn Luke MD 
Answering service: [phone] from in house phone Date of Service:  3/24/2019 NAME:  Vargas Hill :  1980 MRN:  [id number] Primary Care Provider: Iza Pedersen MD 
 
Chief Complaint:  
Chief Complaint Patient presents with  Abdominal Pain History of Present Illness:  
 
Vargas Hill is a 34 y.o. male who presents with chest pain. Pt is AAOX3 and is able to provide history. Pt reports that he had a huge meal Friday night. He went to bed in usual state of health but woke up at 1230 am with chest pain which appeared to be worsening GERD like pain, he took tums and zantac with minimal improvement in pain, was up all night. Pain improved with sitting up and forward and worse with lying down. and as the time went by, his pain improved and did not recur. He had an event less day but on Saturday night, the similar symptoms recurred so [facility] for Nexium without much relief. Patient states that his pain did not improve, rather progressed with time and the pain type changed from GERD like to dull ache, now radiating to between shoulder blades and to left shoulder/neck along with anterior chest, pain [facility], no associated SOB but did report pain with deep breathing. Pt reports no cough or sputum production. patient was given Toradol at the [facility] where his pain improved to 3/10 and has been improving since. Patients blood work revealed almost normal work up with mild elevation of D-Dimer so CTPE was done which was neg for PE but did show atelectasis. EKG show NSR with early repol changes. Patient is afebrile, denies prior similar episodes. Denies cough or flu like symptoms. Denies NVD, no dysuria/hematuria. No prior cardiac history, lives sedentary life style but no recent long travels. Chart reviewed at length. Review of Systems: Pertinent positives noted in HPI. All other systems were reviewed and are negative. Past Medical and surgical history:  
Obesity GERD: uses Zantac PRN Past Surgical History:  
Procedure Laterality Date  HX VASECTOMY    
 2016 Home medications: 
Prior to Admission medications Medication Sig Start Date End Date Taking? Authorizing Provider  
raNITIdine (ZANTAC) 150 mg tablet Take 150 mg by mouth two (2) times a day. Yes Other, MD Anuradha  
Cetirizine (ZYRTEC) 10 mg cap Take 5 mg by mouth. Yes Other, MD Anuradha  
multivitamin (ONE A DAY) tablet Take 1 Tab by mouth daily. Yes Provider, Historical  
 
 
Allergies: 
No Known Allergies Family history:  
History reviewed. No FH of DM2, or premature CAD 
 
SOCIAL HISTORY: 
Patient resides at Home. Patient ambulates with out any device. Smoking history: reports that he has quit smoking. He has never used smokeless tobacco. 
Drug History:  reports that he does not use drugs. Alcohol history:  reports that he drinks alcohol. 4 times/week Objective:  
 
 
Physical Exam:  
Visit Vitals BP (!) 160/93 (BP 1 Location: Right arm, BP Patient Position: Sitting) Pulse (!) 108 Temp 98.7 °F (37.1 °C) Resp 18 Ht 6' 3\" (1.905 m) Wt 115.7 kg (255 lb 1.2 oz) SpO2 98% BMI 31.88 kg/m² General:  Alert, cooperative, no distress, appears stated age. obese Head:  Normocephalic, without obvious abnormality, atraumatic. Eyes:  Conjunctivae/corneas clear Nose: Nares normal.   
Throat: Lips, mucosa, and tongue normal.   
Neck: Supple, symmetrical, trachea midline, no adenopathy, thyroid: no enlargement/tenderness/nodules, no carotid bruit and no JVD. Lungs:   Clear to auscultation bilaterally. Chest wall:  No tenderness or deformity. Heart:  Sinus tachycardia, S1, S2 normal, no murmur, click, rub or gallop. Abdomen:   Soft, non-tender. Bowel sounds normal. No masses,  No organomegaly. Extremities: Extremities normal, atraumatic, no cyanosis or edema. Pulses: 2+ and symmetric all extremities. Skin: Skin color, texture, turgor normal. No rashes or lesions Lymph nodes: Cervical, supraclavicular nodes normal.  
Neurologic: CNII-XII intact. Normal strength, sensation and reflexes throughout. ECG:  Reviewed by myself, normal sinus rhythm, early repolarization changes Laboratory and other diagnostic Data Review: All diagnostic labs and studies have been reviewed. Xr Chest Pa Lat Result Date: 3/24/2019 EXAM: XR CHEST PA LAT INDICATION: chest pain to left shoulder COMPARISON: None. FINDINGS: PA and lateral radiographs of the chest demonstrate slight basilar atelectasis/scarring. No focal airspace process is identified. . There is minimal blunting of the left CP angle laterally. The cardiac and mediastinal contours and pulmonary vascularity are normal. The bones and soft tissues are within normal limits. IMPRESSION: There is slight bibasilar atelectasis /scarring. No focal airspace process is identified. Cta Chest W Or W Wo Cont Result Date: 3/24/2019 INDICATION:   chest pain, elevated d dimer, eval for PE please COMPARISON:  None TECHNIQUE:  Following the uneventful intravenous administration of 100 cc Isovue 420, thin helical axial images were obtained through the chest. Postprocessing was performed. 3D image postprocessing was performed. CT dose reduction was achieved through the use of a standardized protocol tailored for this examination and automatic exposure control for dose modulation. FINDINGS: There are no enlarged mediastinal or hilar lymph nodes. The heart size is normal.  There is no pericardial effusion. No filling defect is seen within the pulmonary arterial system to suggest pulmonary embolus. The aorta is normal in caliber. There is no focal air space disease. There is bibasilar atelectasis.  No pulmonary nodule or mass is seen. There are no pleural effusions. Limited evaluation of the upper abdomen demonstrates diffuse fatty infiltration of the liver. The osseous structures are unremarkable. IMPRESSION: 1. No evidence of pulmonary embolus. 2.  Bibasilar atelectasis. 3. Diffuse fatty infiltration of the liver. Patient Vitals for the past 12 hrs: 
 Temp Pulse Resp BP SpO2  
03/24/19 1241 98.7 °F (37.1 °C) (!) 108 18 (!) 160/93   
03/24/19 1130 97.8 °F (36.6 °C) 86  (!) 157/113 98 % 03/24/19 1100  90  (!) 165/103 98 % 03/24/19 1027  85 23 (!) 151/100 98 % 03/24/19 0939  88 21 (!) 147/103  Recent Labs  
  03/24/19 
7009 WBC 9.4 HGB 15.7 HCT 46.6  Recent Labs  
  03/24/19 
2779   
K 4.6  CO2 29 BUN 13  
CREA 1.04 * CA 9.0 Recent Labs  
  03/24/19 
4856 SGOT 46* ALT 96* AP 79 TBILI 0.9 TP 8.3* ALB 3.7 GLOB 4.6* LPSE 172 No results for input(s): INR, PTP, APTT in the last 72 hours. No lab exists for component: INREXT No results for input(s): FE, TIBC, PSAT, FERR in the last 72 hours. No results found for: FOL, RBCF No results for input(s): PH, PCO2, PO2 in the last 72 hours. Recent Labs  
  03/24/19 
5823 TROIQ 0.06* No results found for: CHOL, CHOLX, CHLST, CHOLV, HDL, LDL, LDLC, DLDLP, TGLX, TRIGL, TRIGP, CHHD, CHHDX No results found for: Milinda Kast No results found for: COLOR, APPRN, SPGRU, REFSG, SKYLAR, PROTU, GLUCU, KETU, BILU, UROU, GEORGI, LEUKU, GLUKE, EPSU, BACTU, WBCU, RBCU, CASTS, UCRY Assessment:  
Given the patient's current clinical presentation, I have a high level of concern for decompensation if discharged from the emergency department.  Complex decision making was performed, which includes reviewing the patient's available past medical records, laboratory results, and x-ray films.    
  
My assessment of this patient's clinical condition and my plan of care is as follows. Principal Problem: 
  Chest pain (3/24/2019) Plan:  
 
- chest pain, POA 
R/o ACS 
trp x 1 neg EKG x 1 : early repolarization changes, will repeat EKG today Chest pain responded well to Toradol Trend CE x 3 Cont asa Check FLP, start statins if needed Start on Lopressor given tachycardia and high blood pressures Tele bed Cards consult NTG PRN 
TTE ordered to r/o pericardial effusion - Tachycardia and elevated BP 
CTPE neg for PE 
?anxiety or Toradol side effect Start on lopressor - Fatty liver on CT with mild elevation of LFTs Patient drinks alcohol 4 times a week Advised against excessive alcohol intake Will place on CIWA 
 
- Obesity Body mass index is 31.88 kg/m². Weight loss recommended Check HbA1c, TSH, Free T4 
 
- GERD Cont PEPCID May need GI eval, likely as outpatient Diet: Cardiac Diet Activity: Activity as tolerated DVT prophylaxis: none, low risk Isolation precautions: none Consultations: cards Anticipated disposition: TBD Code status: Full Code Patient is ambulatory PTA Place as an Observation Patient was explained about the risk of admission including and not a complete list including risk of falls,fractures,blood clots,allergic reactions,infections. Patient/family also understands and agrees to the treatment plan including medications and side effect profiles and also understand the risk with radiation while undergoing imaging studies. The patient and the family/friends (after permission given by the patient to discuss) understand this and agree with the admission plan.   
 
 
Signed By: Suzette Barrera MD   
 03/24/19 
1:33 PM

## 2019-03-24 NOTE — PROGRESS NOTES
Keep npo Echo and nuke orders in  
Stop BB , need HR up for test 
NTP for BP Can use amlodipine if needed but suspect bp is pain related, and now getting Morphine

## 2019-03-25 ENCOUNTER — APPOINTMENT (OUTPATIENT)
Dept: NON INVASIVE DIAGNOSTICS | Age: 39
DRG: 282 | End: 2019-03-25
Attending: SPECIALIST
Payer: COMMERCIAL

## 2019-03-25 PROBLEM — I21.4 NSTEMI (NON-ST ELEVATED MYOCARDIAL INFARCTION) (HCC): Status: ACTIVE | Noted: 2019-03-25

## 2019-03-25 LAB
ALBUMIN SERPL-MCNC: 3.2 G/DL (ref 3.5–5)
ALBUMIN/GLOB SERPL: 0.7 {RATIO} (ref 1.1–2.2)
ALP SERPL-CCNC: 67 U/L (ref 45–117)
ALT SERPL-CCNC: 65 U/L (ref 12–78)
ANION GAP SERPL CALC-SCNC: 5 MMOL/L (ref 5–15)
APTT PPP: 39.4 SEC (ref 22.1–32)
APTT PPP: 64.2 SEC (ref 22.1–32)
AST SERPL-CCNC: 29 U/L (ref 15–37)
ATRIAL RATE: 107 BPM
BASOPHILS # BLD: 0 K/UL (ref 0–0.1)
BASOPHILS NFR BLD: 0 % (ref 0–1)
BILIRUB DIRECT SERPL-MCNC: 0.2 MG/DL (ref 0–0.2)
BILIRUB SERPL-MCNC: 0.6 MG/DL (ref 0.2–1)
BUN SERPL-MCNC: 15 MG/DL (ref 6–20)
BUN/CREAT SERPL: 16 (ref 12–20)
CALCIUM SERPL-MCNC: 8.4 MG/DL (ref 8.5–10.1)
CALCULATED P AXIS, ECG09: 32 DEGREES
CALCULATED R AXIS, ECG10: -8 DEGREES
CALCULATED T AXIS, ECG11: 21 DEGREES
CHLORIDE SERPL-SCNC: 104 MMOL/L (ref 97–108)
CHOLEST SERPL-MCNC: 153 MG/DL
CO2 SERPL-SCNC: 25 MMOL/L (ref 21–32)
CREAT SERPL-MCNC: 0.96 MG/DL (ref 0.7–1.3)
DIAGNOSIS, 93000: NORMAL
DIFFERENTIAL METHOD BLD: ABNORMAL
ECHO AO ROOT DIAM: 3.24 CM
ECHO LA VOL BP: 79.94 ML (ref 18–58)
ECHO LA VOL/BSA BIPLANE: 32.91 ML/M2 (ref 16–28)
ECHO LV E' LATERAL VELOCITY: 13.75 CM/S
ECHO LV E' SEPTAL VELOCITY: 8.57 CM/S
ECHO RV TAPSE: 2.02 CM (ref 1.5–2)
EOSINOPHIL # BLD: 0.1 K/UL (ref 0–0.4)
EOSINOPHIL NFR BLD: 1 % (ref 0–7)
ERYTHROCYTE [DISTWIDTH] IN BLOOD BY AUTOMATED COUNT: 12.4 % (ref 11.5–14.5)
GLOBULIN SER CALC-MCNC: 4.3 G/DL (ref 2–4)
GLUCOSE SERPL-MCNC: 110 MG/DL (ref 65–100)
HCT VFR BLD AUTO: 42.5 % (ref 36.6–50.3)
HDLC SERPL-MCNC: 45 MG/DL
HDLC SERPL: 3.4 {RATIO} (ref 0–5)
HGB BLD-MCNC: 13.6 G/DL (ref 12.1–17)
IMM GRANULOCYTES # BLD AUTO: 0 K/UL (ref 0–0.04)
IMM GRANULOCYTES NFR BLD AUTO: 0 % (ref 0–0.5)
LDLC SERPL CALC-MCNC: 77.4 MG/DL (ref 0–100)
LIPID PROFILE,FLP: ABNORMAL
LYMPHOCYTES # BLD: 2.5 K/UL (ref 0.8–3.5)
LYMPHOCYTES NFR BLD: 26 % (ref 12–49)
MCH RBC QN AUTO: 30.6 PG (ref 26–34)
MCHC RBC AUTO-ENTMCNC: 32 G/DL (ref 30–36.5)
MCV RBC AUTO: 95.7 FL (ref 80–99)
MONOCYTES # BLD: 1.2 K/UL (ref 0–1)
MONOCYTES NFR BLD: 12 % (ref 5–13)
NEUTS SEG # BLD: 5.9 K/UL (ref 1.8–8)
NEUTS SEG NFR BLD: 61 % (ref 32–75)
NRBC # BLD: 0 K/UL (ref 0–0.01)
NRBC BLD-RTO: 0 PER 100 WBC
P-R INTERVAL, ECG05: 124 MS
PLATELET # BLD AUTO: 223 K/UL (ref 150–400)
PMV BLD AUTO: 10.7 FL (ref 8.9–12.9)
POTASSIUM SERPL-SCNC: 3.8 MMOL/L (ref 3.5–5.1)
PROT SERPL-MCNC: 7.5 G/DL (ref 6.4–8.2)
Q-T INTERVAL, ECG07: 344 MS
QRS DURATION, ECG06: 90 MS
QTC CALCULATION (BEZET), ECG08: 459 MS
RBC # BLD AUTO: 4.44 M/UL (ref 4.1–5.7)
SODIUM SERPL-SCNC: 134 MMOL/L (ref 136–145)
THERAPEUTIC RANGE,PTTT: ABNORMAL SECS (ref 58–77)
THERAPEUTIC RANGE,PTTT: ABNORMAL SECS (ref 58–77)
TRIGL SERPL-MCNC: 153 MG/DL (ref ?–150)
TROPONIN I SERPL-MCNC: 0.92 NG/ML
TROPONIN I SERPL-MCNC: 3.24 NG/ML
VENTRICULAR RATE, ECG03: 107 BPM
VLDLC SERPL CALC-MCNC: 30.6 MG/DL
WBC # BLD AUTO: 9.8 K/UL (ref 4.1–11.1)

## 2019-03-25 PROCEDURE — B2111ZZ FLUOROSCOPY OF MULTIPLE CORONARY ARTERIES USING LOW OSMOLAR CONTRAST: ICD-10-PCS | Performed by: SPECIALIST

## 2019-03-25 PROCEDURE — 85730 THROMBOPLASTIN TIME PARTIAL: CPT

## 2019-03-25 PROCEDURE — 74011250636 HC RX REV CODE- 250/636: Performed by: SPECIALIST

## 2019-03-25 PROCEDURE — 74011250636 HC RX REV CODE- 250/636: Performed by: INTERNAL MEDICINE

## 2019-03-25 PROCEDURE — 74011636320 HC RX REV CODE- 636/320: Performed by: SPECIALIST

## 2019-03-25 PROCEDURE — 99218 HC RM OBSERVATION: CPT

## 2019-03-25 PROCEDURE — 93458 L HRT ARTERY/VENTRICLE ANGIO: CPT | Performed by: SPECIALIST

## 2019-03-25 PROCEDURE — 84484 ASSAY OF TROPONIN QUANT: CPT

## 2019-03-25 PROCEDURE — 65270000032 HC RM SEMIPRIVATE

## 2019-03-25 PROCEDURE — C1894 INTRO/SHEATH, NON-LASER: HCPCS | Performed by: SPECIALIST

## 2019-03-25 PROCEDURE — 77030004533 HC CATH ANGI DX IMP BSC -B: Performed by: SPECIALIST

## 2019-03-25 PROCEDURE — 74011250637 HC RX REV CODE- 250/637: Performed by: INTERNAL MEDICINE

## 2019-03-25 PROCEDURE — 99152 MOD SED SAME PHYS/QHP 5/>YRS: CPT | Performed by: SPECIALIST

## 2019-03-25 PROCEDURE — 36415 COLL VENOUS BLD VENIPUNCTURE: CPT

## 2019-03-25 PROCEDURE — 74011250636 HC RX REV CODE- 250/636

## 2019-03-25 PROCEDURE — 4A023N7 MEASUREMENT OF CARDIAC SAMPLING AND PRESSURE, LEFT HEART, PERCUTANEOUS APPROACH: ICD-10-PCS | Performed by: SPECIALIST

## 2019-03-25 PROCEDURE — 85025 COMPLETE CBC W/AUTO DIFF WBC: CPT

## 2019-03-25 PROCEDURE — 93306 TTE W/DOPPLER COMPLETE: CPT

## 2019-03-25 PROCEDURE — 80061 LIPID PANEL: CPT

## 2019-03-25 PROCEDURE — 80048 BASIC METABOLIC PNL TOTAL CA: CPT

## 2019-03-25 PROCEDURE — 80076 HEPATIC FUNCTION PANEL: CPT

## 2019-03-25 PROCEDURE — B2151ZZ FLUOROSCOPY OF LEFT HEART USING LOW OSMOLAR CONTRAST: ICD-10-PCS | Performed by: SPECIALIST

## 2019-03-25 PROCEDURE — 74011250637 HC RX REV CODE- 250/637: Performed by: SPECIALIST

## 2019-03-25 PROCEDURE — 77030013744: Performed by: SPECIALIST

## 2019-03-25 PROCEDURE — 99153 MOD SED SAME PHYS/QHP EA: CPT | Performed by: SPECIALIST

## 2019-03-25 RX ORDER — MIDAZOLAM HYDROCHLORIDE 1 MG/ML
INJECTION, SOLUTION INTRAMUSCULAR; INTRAVENOUS AS NEEDED
Status: DISCONTINUED | OUTPATIENT
Start: 2019-03-25 | End: 2019-03-25 | Stop reason: HOSPADM

## 2019-03-25 RX ORDER — HEPARIN SODIUM 200 [USP'U]/100ML
INJECTION, SOLUTION INTRAVENOUS
Status: COMPLETED | OUTPATIENT
Start: 2019-03-25 | End: 2019-03-25

## 2019-03-25 RX ORDER — SODIUM CHLORIDE 9 MG/ML
25 INJECTION, SOLUTION INTRAVENOUS CONTINUOUS
Status: DISPENSED | OUTPATIENT
Start: 2019-03-25 | End: 2019-03-25

## 2019-03-25 RX ORDER — SODIUM CHLORIDE 9 MG/ML
3 INJECTION, SOLUTION INTRAVENOUS CONTINUOUS
Status: DISCONTINUED | OUTPATIENT
Start: 2019-03-25 | End: 2019-03-25

## 2019-03-25 RX ORDER — HEPARIN SODIUM 5000 [USP'U]/ML
4000 INJECTION, SOLUTION INTRAVENOUS; SUBCUTANEOUS ONCE
Status: COMPLETED | OUTPATIENT
Start: 2019-03-25 | End: 2019-03-25

## 2019-03-25 RX ORDER — SODIUM CHLORIDE 9 MG/ML
1.5 INJECTION, SOLUTION INTRAVENOUS CONTINUOUS
Status: DISCONTINUED | OUTPATIENT
Start: 2019-03-25 | End: 2019-03-25

## 2019-03-25 RX ORDER — FENTANYL CITRATE 50 UG/ML
INJECTION, SOLUTION INTRAMUSCULAR; INTRAVENOUS AS NEEDED
Status: DISCONTINUED | OUTPATIENT
Start: 2019-03-25 | End: 2019-03-25 | Stop reason: HOSPADM

## 2019-03-25 RX ORDER — LIDOCAINE HYDROCHLORIDE 10 MG/ML
INJECTION INFILTRATION; PERINEURAL AS NEEDED
Status: DISCONTINUED | OUTPATIENT
Start: 2019-03-25 | End: 2019-03-25 | Stop reason: HOSPADM

## 2019-03-25 RX ADMIN — MULTIPLE VITAMINS W/ MINERALS TAB 1 TABLET: TAB at 16:58

## 2019-03-25 RX ADMIN — Medication 10 ML: at 05:54

## 2019-03-25 RX ADMIN — METOPROLOL TARTRATE 25 MG: 25 TABLET ORAL at 16:57

## 2019-03-25 RX ADMIN — OXYCODONE AND ACETAMINOPHEN 1 TABLET: 5; 325 TABLET ORAL at 20:45

## 2019-03-25 RX ADMIN — HEPARIN SODIUM 4000 UNITS: 5000 INJECTION, SOLUTION INTRAVENOUS; SUBCUTANEOUS at 02:30

## 2019-03-25 RX ADMIN — ASPIRIN 325 MG: 325 TABLET ORAL at 09:12

## 2019-03-25 RX ADMIN — ROSUVASTATIN CALCIUM 20 MG: 10 TABLET, FILM COATED ORAL at 20:45

## 2019-03-25 RX ADMIN — Medication 10 ML: at 20:45

## 2019-03-25 RX ADMIN — Medication 100 MG: at 16:58

## 2019-03-25 RX ADMIN — FAMOTIDINE 20 MG: 20 TABLET ORAL at 08:37

## 2019-03-25 RX ADMIN — FAMOTIDINE 20 MG: 20 TABLET ORAL at 18:00

## 2019-03-25 RX ADMIN — SODIUM CHLORIDE 3 ML/KG/HR: 900 INJECTION, SOLUTION INTRAVENOUS at 12:48

## 2019-03-25 RX ADMIN — FOLIC ACID 1 MG: 1 TABLET ORAL at 16:58

## 2019-03-25 RX ADMIN — METOPROLOL TARTRATE 25 MG: 25 TABLET ORAL at 23:31

## 2019-03-25 RX ADMIN — METOPROLOL TARTRATE 25 MG: 25 TABLET ORAL at 05:54

## 2019-03-25 RX ADMIN — MORPHINE SULFATE 2 MG: 2 INJECTION, SOLUTION INTRAMUSCULAR; INTRAVENOUS at 05:54

## 2019-03-25 NOTE — PROGRESS NOTES
Cardiac Cath Lab Procedure Area Arrival Note: 
 
John Paul Thomas arrived to Cardiac Cath Lab, Procedure Area. Patient identifiers verified with NAME and DATE OF BIRTH. Procedure verified with patient. Consent forms verified. Allergies verified. Patient informed of procedure and plan of care. Questions answered with review. Patient voiced understanding of procedure and plan of care. Patient on cardiac monitor, non-invasive blood pressure, SPO2 monitor. On room air then placed on O2 @ 2 lpm via NC.  IV of normal saline on pump at 346 ml/hr. Patient status doing well without problems. Patient is A&Ox 4. Patient reports pain between shoulder blades 2/10 on pain scale. Patient medicated during procedure with orders obtained and verified by Dr. Max Nesbitt. Refer to patients Cardiac Cath Lab PROCEDURE REPORT for vital signs, assessment, status, and response during procedure, printed at end of case. Printed report on chart or scanned into chart.

## 2019-03-25 NOTE — PROGRESS NOTES
TRANSFER - OUT REPORT: 
 
Verbal report given to Hazel Stahl RN (name) on Vargas Hill  being transferred to cath lab recovery room (unit) for routine progression of care Report consisted of patients Situation, Background, Assessment and  
Recommendations(SBAR). Information from the following report(s) SBAR, Procedure Summary and MAR was reviewed with the receiving nurse. Lines:  
Peripheral IV 03/24/19 Right Antecubital (Active) Site Assessment Clean, dry, & intact 3/25/2019  8:34 AM  
Phlebitis Assessment 0 3/25/2019  8:34 AM  
Infiltration Assessment 0 3/25/2019  8:34 AM  
Dressing Status Clean, dry, & intact 3/25/2019  8:34 AM  
Dressing Type Transparent;Tape 3/25/2019  8:34 AM  
Hub Color/Line Status Green;Capped 3/25/2019  8:34 AM  
Action Taken Open ports on tubing capped 3/25/2019  8:34 AM  
Alcohol Cap Used Yes 3/25/2019  8:34 AM  
   
Peripheral IV 03/24/19 Left Arm (Active) Site Assessment Clean, dry, & intact 3/25/2019  8:34 AM  
Phlebitis Assessment 0 3/25/2019  8:34 AM  
Infiltration Assessment 0 3/25/2019  8:34 AM  
Dressing Status Clean, dry, & intact 3/25/2019  8:34 AM  
Dressing Type Transparent;Tape 3/25/2019  8:34 AM  
Hub Color/Line Status Pink;Capped 3/25/2019  8:34 AM  
Action Taken Open ports on tubing capped 3/25/2019  8:34 AM  
Alcohol Cap Used Yes 3/25/2019  8:34 AM  
  
 
Opportunity for questions and clarification was provided. Patient transported with: 
 Registered Nurse

## 2019-03-25 NOTE — CARDIO/PULMONARY
Cardiac Rehab: MI education folder, with catheterization brochure, to bedside of Joselyn Ashford with the pt., his wife and family members to provide education for the MI and pre cath. Educated using teach back method. Reviewed MI diagnosis definition and purpose of intervention. Discussed risk factors for CAD to include the following: family history, elevated BMI, hyperlipidemia, hypertension, diabetes, stress, and smoking. . Discussed Heart Healthy/Low Sodium (2000 mg) diet. s. Discussed follow up appointments with cardiologist, signs and symptoms of angina, and what to report to physician after discharge. Emphasized the value of cardiac rehab. Discussed Cardiac Rehab Program format, benefits, and encouraged enrollment to assist with risk modification and management. Discussed the purpose of the cardiac cath including information about moderate sedation, possible treatments/interventions and post  procedurearterial site  Management. Sydney Or verbalized understanding. Will follow for results. DOYLE Dewey

## 2019-03-25 NOTE — PROGRESS NOTES
TRANSFER - OUT REPORT: 
 
Verbal report given to Krys Bess RN(name) on Major Shorts  being transferred to CVSU(unit) for routine progression of care Report consisted of patients Situation, Background, Assessment and  
Recommendations(SBAR). Information from the following report(s) Procedure Summary, Intake/Output, MAR and Recent Results was reviewed with the receiving nurse. Lines:  
Peripheral IV 03/24/19 Right Antecubital (Active) Site Assessment Clean, dry, & intact 3/25/2019  8:34 AM  
Phlebitis Assessment 0 3/25/2019  8:34 AM  
Infiltration Assessment 0 3/25/2019  8:34 AM  
Dressing Status Clean, dry, & intact 3/25/2019  8:34 AM  
Dressing Type Transparent;Tape 3/25/2019  8:34 AM  
Hub Color/Line Status Green;Capped 3/25/2019  8:34 AM  
Action Taken Open ports on tubing capped 3/25/2019  8:34 AM  
Alcohol Cap Used Yes 3/25/2019  8:34 AM  
   
Peripheral IV 03/24/19 Left Arm (Active) Site Assessment Clean, dry, & intact 3/25/2019  8:34 AM  
Phlebitis Assessment 0 3/25/2019  8:34 AM  
Infiltration Assessment 0 3/25/2019  8:34 AM  
Dressing Status Clean, dry, & intact 3/25/2019  8:34 AM  
Dressing Type Transparent;Tape 3/25/2019  8:34 AM  
Hub Color/Line Status Pink;Capped 3/25/2019  8:34 AM  
Action Taken Open ports on tubing capped 3/25/2019  8:34 AM  
Alcohol Cap Used Yes 3/25/2019  8:34 AM  
  
 
Opportunity for questions and clarification was provided. Patient transported with: 
 Monitor Registered Nurse

## 2019-03-25 NOTE — PROCEDURES
Cardiac Catheterization Procedure Note   Patient: Paxton Sung  MRN: [id number]  SSN: [id number]   YOB: 1980 Age: 34 y.o. Sex: male    Date of Procedure: 3/25/2019   Pre-procedure Diagnosis: NSTEMI  Post-procedure Diagnosis: Non-cardiac Chest Pain  Procedure: Left Heart Cath  :  Dr. Molly Brasher MD    Assistant(s):  None  Anesthesia: Moderate Sedation   Estimated Blood Loss: Less than 10 mL   Specimens Removed: None  Findings: no significant epicardial cad. Normal lvef. eitiology of elev trop unclear.   Complications: None   Implants:  None  Signed by:  Molly Brasher MD  3/25/2019  1:44 PM

## 2019-03-25 NOTE — PROGRESS NOTES
1350:  TRANSFER - IN REPORT: 
 
Verbal report received from NEPTALI Reis RN on [employer] , from the Cardiac Cath lab, for routine progression of care. Report consisted of patients Situation, Background, Assessment and Recommendations(SBAR). Information from the following report(s) Procedure Summary, Intake/Output, MAR and Recent Results was reviewed with the receiving clinician. Opportunity for questions and clarification was provided. Assessment completed upon patients arrival to [address] [place] [address] and care assumed. Cardiac Cath Lab Recovery Arrival Note: 
 
 [employer] arrived to [facility] recovery area. Patient procedure= LHC. Patient on cardiac monitor, non-invasive blood pressure, Patient status doing well without problems. Patient is A&Ox 4. Patient reports no complaints. Procedure site without any bleeding and no hematoma.

## 2019-03-25 NOTE — PROGRESS NOTES
2000: Bedside and Verbal shift change report given to Nicole Hadley (oncoming nurse) by Xavi Starr (offgoing nurse). Report included the following information SBAR, Kardex and ED Summary.

## 2019-03-25 NOTE — PROGRESS NOTES
Reason for Admission:   Chest Pain RRAT Score:          3 Plan for utilizing home health:      No plan for home health at this time. Likelihood of Readmission:  low Transition of Care Plan:         CM met with patient to discuss discharge plans. Spouse was at bedside. Patient lives with spouse. Patient is independent with ADL's. No medical equipment or assisting devices. No oxygen, uses room air. Patient transports himself to and from using private vehicle. Spouse will transport patient at discharge. Confirmed PCP is Dr. Tram Reza. patient advised last seen summer of 2018. No plan for home health. Patient provided Observation letter. CM placed in chart. CM to follow patient for updates. NATASHA Mcgarry/CRM Care Management Interventions PCP Verified by CM: Yes Mode of Transport at Discharge: (Spouse will transport) Current Support Network: Lives with Spouse Confirm Follow Up Transport: Family Plan discussed with Pt/Family/Caregiver: Yes [employer] Information Provided?: No 
Discharge Location Discharge Placement: Home

## 2019-03-25 NOTE — PROGRESS NOTES
1200: TRANSFER - OUT REPORT: 
 
Verbal report given to Charmaine Barrera RN(name) on Sheila King  being transferred to Cath Lab(unit) for routine progression of care Report consisted of patients Situation, Background, Assessment and  
Recommendations(SBAR). Information from the following report(s) SBAR, Kardex, Procedure Summary, Intake/Output, MAR and Recent Results was reviewed with the receiving nurse. Lines:  
Peripheral IV 03/24/19 Right Antecubital (Active) Site Assessment Clean, dry, & intact 3/25/2019  8:34 AM  
Phlebitis Assessment 0 3/25/2019  8:34 AM  
Infiltration Assessment 0 3/25/2019  8:34 AM  
Dressing Status Clean, dry, & intact 3/25/2019  8:34 AM  
Dressing Type Transparent;Tape 3/25/2019  8:34 AM  
Hub Color/Line Status Green;Capped 3/25/2019  8:34 AM  
Action Taken Open ports on tubing capped 3/25/2019  8:34 AM  
Alcohol Cap Used Yes 3/25/2019  8:34 AM  
   
Peripheral IV 03/24/19 Left Arm (Active) Site Assessment Clean, dry, & intact 3/25/2019  8:34 AM  
Phlebitis Assessment 0 3/25/2019  8:34 AM  
Infiltration Assessment 0 3/25/2019  8:34 AM  
Dressing Status Clean, dry, & intact 3/25/2019  8:34 AM  
Dressing Type Transparent;Tape 3/25/2019  8:34 AM  
Hub Color/Line Status Pink;Capped 3/25/2019  8:34 AM  
Action Taken Open ports on tubing capped 3/25/2019  8:34 AM  
Alcohol Cap Used Yes 3/25/2019  8:34 AM  
  
 
Opportunity for questions and clarification was provided. Patient transported with: 
 Monitor Registered Nurse 
 
: TRANSFER - IN REPORT: 
 
Verbal report received from [address] RN(name) on Sheila King  being received from Cath Lab(unit) for routine progression of care Report consisted of patients Situation, Background, Assessment and  
Recommendations(SBAR). Information from the following report(s) SBAR, Kardex, Procedure Summary, Intake/Output, MAR and Recent Results was reviewed with the receiving nurse. Opportunity for questions and clarification was provided. Assessment completed upon patients arrival to unit and care assumed.

## 2019-03-25 NOTE — ROUTINE PROCESS
Cardiac Cath Lab Recovery Arrival Note: 
 
 
Paxton Sung arrived to Cardiac Cath Lab, Recovery Area. Staff introduced to patient. Patient identifiers verified with NAME and DATE OF BIRTH. Procedure verified with patient. Consent forms reviewed and signed by patient or authorized representative and verified. Allergies verified. Patient informed of procedure and plan of care. Questions answered with review. Patient prepped for procedure, per orders from physician, prior to arrival. 
 
Patient on cardiac monitor, non-invasive blood pressure, SPO2 monitor. Patient is A&Ox 4. Patient reports no complaints. Patient in stretcher, in low position, with side rails up, call bell within reach, patient instructed to call of assistance as needed. Patient prep in: [facility] Recovery Area, Bed# 3. Family in: waiting room. Prep by: SUNIL Bellamy

## 2019-03-25 NOTE — PROGRESS NOTES
Hospitalist Progress Note Rudy Esquivel MD 
Answering service: [phone] OR [phone] from in house phone Date of Service:  3/25/2019 NAME:  Rosario Cedillo :  1980 MRN:  [id number] PCP: Preeti Magana MD 
 
Chief Complaint:  
Chief Complaint Patient presents with  Abdominal Pain Admission Summary:  
 
Rosario Cedillo is a 34 y.o. male who presented with chest pain Interval history / Subjective:  
Patient seen for Follow up of NSTEMI Patient seen and examined by the bedside Labs, images and notes reviewed Patient is comfortable Denies any chest pain at moment, afebrile, cardiac enzymes trended up last night and was placed on heparin gtt. No fevers or chills No nausea or vomiting Discussed with nursing staff, no acute issues overnight, orders reviewed. Assessment & Plan:  
 
NSTEMI, POA Heparin gtt NTG gtt NPO for cardiac cath today Cards on board ASA, statins, Beta blockers TTE pending - Tachycardia and elevated BP, resolved CTPE neg for PE 
?anxiety or Toradol side effect 
lopressor 
  
- Fatty liver on CT with mild elevation of LFTs Patient drinks alcohol 4 times a week Advised against excessive alcohol intake 
cont CIWA 
  
- Obesity Body mass index is 31.88 kg/m². Weight loss recommended Check HbA1c Normal TSH, Free T4 
  
- GERD Cont PEPCID May need GI eval, likely as outpatient 
  
Code status: Full Code DVT prophylaxis: heparin Care Plan discussed with: Patient/Family and Nurse Disposition: Patient's status is being changed to inpatient because of NSTEMI I have reviewed HPI done by Myself and available in the [facility] EMR at [facility]. I have personally reviewed past medical, surgical and family and social history along with medications, allergies and review of symptoms and found it to be unchanged except for what described above. Hospital Problems  Date Reviewed: 3/24/2019 Codes Class Noted POA * (Principal) Chest pain ICD-10-CM: R07.9 ICD-9-CM: 786.50  3/24/2019 Unknown Review of Systems:  
Pertinent items are noted in HPI. Physical Examination:  
 
  General appearance: alert, cooperative, no distress, appears stated age Head: Normocephalic, without obvious abnormality, atraumatic Lungs: clear to auscultation bilaterally Chest wall: no tenderness Heart: regular rate and rhythm Abdomen: soft, non-tender. Bowel sounds normal. No masses,  no organomegaly Extremities: extremities normal, atraumatic, no cyanosis or edema Skin: Skin color, texture, turgor normal. No rashes or lesions Neurologic: Grossly normal 
  
 
Vital Signs:  
 Last 24hrs VS reviewed since prior progress note. Most recent are: 
 
Visit Vitals /58 (BP 1 Location: Left arm, BP Patient Position: At rest) Pulse 68 Temp 98.7 °F (37.1 °C) Resp 17 Ht 6' 3\" (1.905 m) Wt 115.2 kg (254 lb) SpO2 94% BMI 31.75 kg/m² Intake/Output Summary (Last 24 hours) at 3/25/2019 1452 Last data filed at 3/25/2019 6156 Gross per 24 hour Intake 0 ml Output 0 ml Net 0 ml Tmax:  Temp (24hrs), Av.6 °F (37 °C), Min:98 °F (36.7 °C), Max:99.5 °F (37.5 °C) Data Review: Xr Chest Pa Lat Result Date: 3/24/2019 EXAM: XR CHEST PA LAT INDICATION: chest pain to left shoulder COMPARISON: None. FINDINGS: PA and lateral radiographs of the chest demonstrate slight basilar atelectasis/scarring. No focal airspace process is identified. . There is minimal blunting of the left CP angle laterally. The cardiac and mediastinal contours and pulmonary vascularity are normal. The bones and soft tissues are within normal limits. IMPRESSION: There is slight bibasilar atelectasis /scarring. No focal airspace process is identified. Cta Chest W Or W Wo Cont Result Date: 3/24/2019 INDICATION:   chest pain, elevated d dimer, eval for PE please COMPARISON: None TECHNIQUE:  Following the uneventful intravenous administration of 100 cc Isovue 315, thin helical axial images were obtained through the chest. Postprocessing was performed. 3D image postprocessing was performed. CT dose reduction was achieved through the use of a standardized protocol tailored for this examination and automatic exposure control for dose modulation. FINDINGS: There are no enlarged mediastinal or hilar lymph nodes. The heart size is normal.  There is no pericardial effusion. No filling defect is seen within the pulmonary arterial system to suggest pulmonary embolus. The aorta is normal in caliber. There is no focal air space disease. There is bibasilar atelectasis. No pulmonary nodule or mass is seen. There are no pleural effusions. Limited evaluation of the upper abdomen demonstrates diffuse fatty infiltration of the liver. The osseous structures are unremarkable. IMPRESSION: 1. No evidence of pulmonary embolus. 2.  Bibasilar atelectasis. 3. Diffuse fatty infiltration of the liver. [id number] [facility] Result Date: 3/25/2019 ULTRASOUND OF THE RIGHT UPPER QUADRANT INDICATION: RUQ abd pain COMPARISON: None. TECHNIQUE: Sonography of the right upper quadrant was performed. FINDINGS: GALLBLADDER: Contains a small amount of biliary sludge but no definite calculi. No wall thickening. Small pericholecystic fluid present. No sonographic Mcintyre's sign. COMMON DUCT: 4 mm in diameter. No visualized calculi. [place] LIVER: Diffusely increased echogenicity. No focal hepatic mass or intrahepatic biliary dilatation is shown. MAIN PORTAL VEIN: Patent. Appropriate hepatopetal flow. PANCREAS: Obscured by overlying bowel gas. RIGHT KIDNEY: 12.8 cm in length. No hydronephrosis, shadowing calculus, or contour-deforming renal mass. IMPRESSION: Small amount of biliary sludge within the gallbladder. No sonographic evidence of acute cholecystitis.  Diffusely increased hepatic echogenicity suggests hepatic parenchymal disease, likely hepatic steatosis. No results found for: SDES No results found for: CULT All Micro Results Procedure Component Value Units Date/Time URINE CULTURE HOLD SAMPLE [[id number]] Collected:  03/24/19 1201 Order Status:  Completed Specimen:  Serum Updated:  03/24/19 1208 Urine culture hold URINE ON HOLD IN MICROBIOLOGY DEPT FOR 3 DAYS. IF UNPRESERVED URINE IS SUBMITTED, IT CANNOT BE USED FOR ADDITIONAL TESTING AFTER 24 HRS, RECOLLECTION WILL BE REQUIRED. Labs:  
 
Recent Labs  
  03/25/19 
0355 03/24/19 
5824 WBC 9.8 9.4 HGB 13.6 15.7 HCT 42.5 46.6  253 Recent Labs  
  03/25/19 
0355 03/24/19 
5241 * 137  
K 3.8 4.6  100 CO2 25 29 BUN 15 13 CREA 0.96 1.04 * 101* CA 8.4* 9.0 MG  --  2.0 Recent Labs  
  03/25/19 
1135 03/24/19 
9085 SGOT 29 46* ALT 65 96* AP 67 79 TBILI 0.6 0.9 TP 7.5 8.3* ALB 3.2* 3.7 GLOB 4.3* 4.6* LPSE  --  172 Recent Labs  
  03/25/19 
0801 03/25/19 
0047 03/24/19 
1549 APTT 64.2* 39.4* 27.7 No results for input(s): FE, TIBC, PSAT, FERR in the last 72 hours. No results found for: FOL, RBCF No results for input(s): PH, PCO2, PO2 in the last 72 hours. Recent Labs  
  03/25/19 
1135 03/25/19 
0047 03/24/19 
1549 TROIQ 0.92* 3.24* 1.07* Lab Results Component Value Date/Time Cholesterol, total 153 03/25/2019 03:55 AM  
 HDL Cholesterol 45 03/25/2019 03:55 AM  
 LDL, calculated 77.4 03/25/2019 03:55 AM  
 Triglyceride 153 (H) 03/25/2019 03:55 AM  
 CHOL/HDL Ratio 3.4 03/25/2019 03:55 AM  
 
No results found for: Milinda Kast No results found for: COLOR, APPRN, SPGRU, REFSG, SKYLAR, PROTU, GLUCU, KETU, BILU, UROU, GEORGI, LEUKU, GLUKE, EPSU, BACTU, WBCU, RBCU, CASTS, UCRY Medications Reviewed:  
 
Current Facility-Administered Medications Medication Dose Route Frequency  famotidine (PEPCID) tablet 20 mg  20 mg Oral BID  sodium chloride (NS) flush 5-40 mL  5-40 mL IntraVENous Q8H  
 sodium chloride (NS) flush 5-40 mL  5-40 mL IntraVENous PRN  
 aspirin tablet 325 mg  325 mg Oral DAILY  nitroglycerin (NITROSTAT) tablet 0.4 mg  0.4 mg SubLINGual Q5MIN PRN  
 acetaminophen (TYLENOL) tablet 650 mg  650 mg Oral Q4H PRN  
 ondansetron (ZOFRAN) injection 4 mg  4 mg IntraVENous Q4H PRN  
 LORazepam (ATIVAN) injection 2 mg  2 mg IntraVENous U4K PRN  
 folic acid (FOLVITE) tablet 1 mg  1 mg Oral DAILY  thiamine HCL (B-1) tablet 100 mg  100 mg Oral DAILY  multivitamin, tx-iron-ca-min (THERA-M w/ IRON) tablet 1 Tab  1 Tab Oral DAILY  morphine injection 2 mg  2 mg IntraVENous Q4H PRN  
 oxyCODONE-acetaminophen (PERCOCET) 5-325 mg per tablet 1-2 Tab  1-2 Tab Oral Q6H PRN  
 rosuvastatin (CRESTOR) tablet 20 mg  20 mg Oral QHS  metoprolol tartrate (LOPRESSOR) tablet 25 mg  25 mg Oral Q6H  
 
______________________________________________________________________ EXPECTED LENGTH OF STAY: - - - 
ACTUAL LENGTH OF STAY:          0 Katiuska Ordonez MD

## 2019-03-25 NOTE — PROGRESS NOTES
Bedside shift change report given to THE [facility], RN (oncoming nurse) by John Carlos RN (offgoing nurse). Report included the following information SBAR, Kardex, Procedure Summary, Intake/Output, MAR and Recent Results.

## 2019-03-25 NOTE — PROGRESS NOTES
Cardiology Progress Note Admit Date: 3/24/2019 Admit Diagnosis: Chest pain [R07.9] Chest pain [R07.9] Date: 3/25/2019     Time: 11:08 AM 
 
HPI: Pt admitted with chief c/o chest apin, mid sternum along esophagus to right shoulder. Episode on Saturday and Sunday. Went [facility] [place] [facility]. Troponins trended up., now 3.24. Wife works as nurse at [employer] urgent care. CTA done at [facility] showed NO PE, + minor atelectasis LHC planned for today. Social Hx-travels once a week for business, 3 kids, non smoker, no long travel of late Family Hx-father with CHF in his 62s, no CAD Assessment and Plan 1. NQMI/NSTEMI:  
 -Troponin trended up 3.24. Repeat this a.m./trend to peak. -Continue heparin gtt ACS dosing 
 -ASA, Lopressor 
 -crestor for now (watch liver enzymes: ALT 95, AST 46. Recheck) 
 -Continue nitro gtt 
 -LHC today with Dr. Alberto Aparicio at 1:15 PM 
 
2. Hx of GERD: 
 -On PPI 3. Elevated liver enzymes: mild 
 -recheck 
 -on statin d/t #1- monitor. -CT chest noted diffuse fatty liver. 4. Elevated BP on admission: 
 -BP improved now on Lopressor and nitro gtt LHC today at 1:15 PM. Assessment/Plan/Discussion:Cardiology Attending:  
 
Patient seen on the day of progress note and examined  and agree with Advance Practice Provider (KIM, NP,PA)  assessment and plans. Breonna Dugan is a 34 y.o. male  
nqmi Severe pain Better but not gone Clear lungs no mrg Cath today Ananda Degroot MD   
 
 
 
Subjective: 
 Breonna Dugan denies chest pain, SOB currently. Did have some chest pain with walking. Objective: 
  
 Physical Exam: 
             
Visit Vitals /76 Pulse 78 Temp 98.7 °F (37.1 °C) Resp 16 Ht 6' 3\" (1.905 m) Wt 254 lb (115.2 kg) SpO2 99% BMI 31.75 kg/m²  General Appearance:   Well developed, well nourished,alert and oriented x 3, and 
 individual in no acute distress. Ears/Nose/Mouth/Throat:    Hearing grossly normal. 
  
    Neck:  Supple. Chest:    Lungs clear to auscultation bilaterally. Cardiovascular:   Regular rate and rhythm, S1, S2 normal, no murmur. Abdomen:    Soft, non-tender, bowel sounds are active. Extremities:  No edema bilaterally. Skin:  Warm and dry. Telemetry: normal sinus rhythm Data Review:  
 Labs:   
Recent Results (from the past 24 hour(s)) DRUG SCREEN, URINE Collection Time: 03/24/19 12:01 PM  
Result Value Ref Range AMPHETAMINES NEGATIVE  NEG    
 BARBITURATES NEGATIVE  NEG BENZODIAZEPINES NEGATIVE  NEG    
 COCAINE NEGATIVE  NEG METHADONE NEGATIVE  NEG    
 OPIATES NEGATIVE  NEG    
 PCP(PHENCYCLIDINE) NEGATIVE  NEG    
 THC (TH-CANNABINOL) NEGATIVE  NEG Drug screen comment (NOTE) URINE CULTURE HOLD SAMPLE Collection Time: 03/24/19 12:01 PM  
Result Value Ref Range Urine culture hold URINE ON HOLD IN MICROBIOLOGY DEPT FOR 3 DAYS. IF UNPRESERVED URINE IS SUBMITTED, IT CANNOT BE USED FOR ADDITIONAL TESTING AFTER 24 HRS, RECOLLECTION WILL BE REQUIRED. EKG, 12 LEAD, INITIAL Collection Time: 03/24/19  3:02 PM  
Result Value Ref Range Ventricular Rate 107 BPM  
 Atrial Rate 107 BPM  
 P-R Interval 124 ms QRS Duration 90 ms Q-T Interval 344 ms QTC Calculation (Bezet) 459 ms Calculated P Axis 32 degrees Calculated R Axis -8 degrees Calculated T Axis 21 degrees Diagnosis Sinus tachycardia Marked ST abnormality, possible lateral subendocardial injury Some baseline artifact is present When compared with ECG of 24-MAR-2019 09:40, 
Lateral ST changes are now present Confirmed by Yang Rascon M.D., Fortunato Rowell ([id number]) on 3/25/2019 9:24:36 AM 
  
TROPONIN I Collection Time: 03/24/19  3:49 PM  
Result Value Ref Range Troponin-I, Qt. 1.07 (H) <0.05 ng/mL PTT Collection Time: 03/24/19  3:49 PM  
Result Value Ref Range aPTT 27.7 22.1 - 32.0 sec  
 aPTT, therapeutic range     58.0 - 77.0 SECS  
TROPONIN I Collection Time: 03/25/19 12:47 AM  
Result Value Ref Range Troponin-I, Qt. 3.24 (H) <0.05 ng/mL PTT Collection Time: 03/25/19 12:47 AM  
Result Value Ref Range aPTT 39.4 (H) 22.1 - 32.0 sec  
 aPTT, therapeutic range     58.0 - 77.0 SECS  
CBC WITH AUTOMATED DIFF Collection Time: 03/25/19  3:55 AM  
Result Value Ref Range WBC 9.8 4.1 - 11.1 K/uL  
 RBC 4.44 4.10 - 5.70 M/uL  
 HGB 13.6 12.1 - 17.0 g/dL HCT 42.5 36.6 - 50.3 % MCV 95.7 80.0 - 99.0 FL  
 MCH 30.6 26.0 - 34.0 PG  
 MCHC 32.0 30.0 - 36.5 g/dL  
 RDW 12.4 11.5 - 14.5 % PLATELET 667 112 - 304 K/uL MPV 10.7 8.9 - 12.9 FL  
 NRBC 0.0 0  WBC ABSOLUTE NRBC 0.00 0.00 - 0.01 K/uL NEUTROPHILS 61 32 - 75 % LYMPHOCYTES 26 12 - 49 % MONOCYTES 12 5 - 13 % EOSINOPHILS 1 0 - 7 % BASOPHILS 0 0 - 1 % IMMATURE GRANULOCYTES 0 0.0 - 0.5 % ABS. NEUTROPHILS 5.9 1.8 - 8.0 K/UL  
 ABS. LYMPHOCYTES 2.5 0.8 - 3.5 K/UL  
 ABS. MONOCYTES 1.2 (H) 0.0 - 1.0 K/UL  
 ABS. EOSINOPHILS 0.1 0.0 - 0.4 K/UL  
 ABS. BASOPHILS 0.0 0.0 - 0.1 K/UL  
 ABS. IMM. GRANS. 0.0 0.00 - 0.04 K/UL  
 DF AUTOMATED METABOLIC PANEL, BASIC Collection Time: 03/25/19  3:55 AM  
Result Value Ref Range Sodium 134 (L) 136 - 145 mmol/L Potassium 3.8 3.5 - 5.1 mmol/L Chloride 104 97 - 108 mmol/L  
 CO2 25 21 - 32 mmol/L Anion gap 5 5 - 15 mmol/L Glucose 110 (H) 65 - 100 mg/dL BUN 15 6 - 20 MG/DL Creatinine 0.96 0.70 - 1.30 MG/DL  
 BUN/Creatinine ratio 16 12 - 20 GFR est AA >60 >60 ml/min/1.73m2 GFR est non-AA >60 >60 ml/min/1.73m2 Calcium 8.4 (L) 8.5 - 10.1 MG/DL  
LIPID PANEL Collection Time: 03/25/19  3:55 AM  
Result Value Ref Range LIPID PROFILE Cholesterol, total 153 <200 MG/DL  Triglyceride 153 (H) <150 MG/DL  
 HDL Cholesterol 45 MG/DL  
 LDL, calculated 77.4 0 - 100 MG/DL  
 VLDL, calculated 30.6 MG/DL  
 CHOL/HDL Ratio 3.4 0.0 - 5.0    
PTT Collection Time: 03/25/19  8:01 AM  
Result Value Ref Range aPTT 64.2 (H) 22.1 - 32.0 sec  
 aPTT, therapeutic range     58.0 - 77.0 SECS  
ECHO ADULT COMPLETE Collection Time: 03/25/19  9:34 AM  
Result Value Ref Range LA Volume 79.94 (A) 18 - 58 mL  
 LV E' Lateral Velocity 13.75 cm/s LV E' Septal Velocity 8.57 cm/s Tapse 2.02 (A) 1.5 - 2.0 cm Ao Root D 3.24 cm Radiology:  
 
  
Current Facility-Administered Medications Medication Dose Route Frequency  famotidine (PEPCID) tablet 20 mg  20 mg Oral BID  sodium chloride (NS) flush 5-40 mL  5-40 mL IntraVENous Q8H  
 sodium chloride (NS) flush 5-40 mL  5-40 mL IntraVENous PRN  
 aspirin tablet 325 mg  325 mg Oral DAILY  nitroglycerin (NITROSTAT) tablet 0.4 mg  0.4 mg SubLINGual Q5MIN PRN  
 acetaminophen (TYLENOL) tablet 650 mg  650 mg Oral Q4H PRN  
 ondansetron (ZOFRAN) injection 4 mg  4 mg IntraVENous Q4H PRN  
 LORazepam (ATIVAN) injection 2 mg  2 mg IntraVENous W5C PRN  
 folic acid (FOLVITE) tablet 1 mg  1 mg Oral DAILY  thiamine HCL (B-1) tablet 100 mg  100 mg Oral DAILY  multivitamin, tx-iron-ca-min (THERA-M w/ IRON) tablet 1 Tab  1 Tab Oral DAILY  morphine injection 2 mg  2 mg IntraVENous Q4H PRN  
 oxyCODONE-acetaminophen (PERCOCET) 5-325 mg per tablet 1-2 Tab  1-2 Tab Oral Q6H PRN  
 rosuvastatin (CRESTOR) tablet 20 mg  20 mg Oral QHS  heparin 25,000 units in D5W 250 ml infusion  8-25 Units/kg/hr IntraVENous TITRATE  nitroglycerin (Tridil) 200 mcg/ml infusion  0-200 mcg/min IntraVENous TITRATE  metoprolol tartrate (LOPRESSOR) tablet 25 mg  25 mg Oral Q6H Brandee Cruz. [facility] [address], [address] [facility], [facility] 
 [phone]

## 2019-03-26 VITALS
SYSTOLIC BLOOD PRESSURE: 107 MMHG | DIASTOLIC BLOOD PRESSURE: 75 MMHG | HEIGHT: 75 IN | BODY MASS INDEX: 31.58 KG/M2 | RESPIRATION RATE: 18 BRPM | TEMPERATURE: 97.9 F | OXYGEN SATURATION: 95 % | WEIGHT: 254 LBS | HEART RATE: 71 BPM

## 2019-03-26 LAB
ANION GAP SERPL CALC-SCNC: 6 MMOL/L (ref 5–15)
BASOPHILS # BLD: 0 K/UL (ref 0–0.1)
BASOPHILS NFR BLD: 0 % (ref 0–1)
BUN SERPL-MCNC: 16 MG/DL (ref 6–20)
BUN/CREAT SERPL: 15 (ref 12–20)
CALCIUM SERPL-MCNC: 8.8 MG/DL (ref 8.5–10.1)
CHLORIDE SERPL-SCNC: 106 MMOL/L (ref 97–108)
CO2 SERPL-SCNC: 25 MMOL/L (ref 21–32)
CREAT SERPL-MCNC: 1.05 MG/DL (ref 0.7–1.3)
DIFFERENTIAL METHOD BLD: ABNORMAL
EOSINOPHIL # BLD: 0.2 K/UL (ref 0–0.4)
EOSINOPHIL NFR BLD: 2 % (ref 0–7)
ERYTHROCYTE [DISTWIDTH] IN BLOOD BY AUTOMATED COUNT: 12.1 % (ref 11.5–14.5)
GLUCOSE SERPL-MCNC: 112 MG/DL (ref 65–100)
HCT VFR BLD AUTO: 43.8 % (ref 36.6–50.3)
HGB BLD-MCNC: 14.2 G/DL (ref 12.1–17)
IMM GRANULOCYTES # BLD AUTO: 0 K/UL (ref 0–0.04)
IMM GRANULOCYTES NFR BLD AUTO: 0 % (ref 0–0.5)
LYMPHOCYTES # BLD: 1.7 K/UL (ref 0.8–3.5)
LYMPHOCYTES NFR BLD: 20 % (ref 12–49)
MCH RBC QN AUTO: 30.7 PG (ref 26–34)
MCHC RBC AUTO-ENTMCNC: 32.4 G/DL (ref 30–36.5)
MCV RBC AUTO: 94.8 FL (ref 80–99)
MONOCYTES # BLD: 1.1 K/UL (ref 0–1)
MONOCYTES NFR BLD: 13 % (ref 5–13)
NEUTS SEG # BLD: 5.3 K/UL (ref 1.8–8)
NEUTS SEG NFR BLD: 65 % (ref 32–75)
NRBC # BLD: 0 K/UL (ref 0–0.01)
NRBC BLD-RTO: 0 PER 100 WBC
PLATELET # BLD AUTO: 241 K/UL (ref 150–400)
PMV BLD AUTO: 10.4 FL (ref 8.9–12.9)
POTASSIUM SERPL-SCNC: 4 MMOL/L (ref 3.5–5.1)
RBC # BLD AUTO: 4.62 M/UL (ref 4.1–5.7)
SODIUM SERPL-SCNC: 137 MMOL/L (ref 136–145)
WBC # BLD AUTO: 8.4 K/UL (ref 4.1–11.1)

## 2019-03-26 PROCEDURE — 74011250637 HC RX REV CODE- 250/637: Performed by: SPECIALIST

## 2019-03-26 PROCEDURE — 94760 N-INVAS EAR/PLS OXIMETRY 1: CPT

## 2019-03-26 PROCEDURE — 74011250637 HC RX REV CODE- 250/637: Performed by: INTERNAL MEDICINE

## 2019-03-26 PROCEDURE — 36415 COLL VENOUS BLD VENIPUNCTURE: CPT

## 2019-03-26 PROCEDURE — 80048 BASIC METABOLIC PNL TOTAL CA: CPT

## 2019-03-26 PROCEDURE — 85025 COMPLETE CBC W/AUTO DIFF WBC: CPT

## 2019-03-26 RX ORDER — CLOPIDOGREL BISULFATE 75 MG/1
75 TABLET ORAL DAILY
Status: DISCONTINUED | OUTPATIENT
Start: 2019-03-26 | End: 2019-03-26 | Stop reason: HOSPADM

## 2019-03-26 RX ORDER — BACLOFEN 5 MG/1
5 TABLET ORAL
Qty: 8 TAB | Refills: 0 | Status: SHIPPED | OUTPATIENT
Start: 2019-03-26 | End: 2019-11-14

## 2019-03-26 RX ORDER — OXYCODONE AND ACETAMINOPHEN 5; 325 MG/1; MG/1
1 TABLET ORAL
Qty: 6 TAB | Refills: 0 | Status: SHIPPED | OUTPATIENT
Start: 2019-03-26 | End: 2019-03-29

## 2019-03-26 RX ORDER — ATORVASTATIN CALCIUM 10 MG/1
10 TABLET, FILM COATED ORAL
Status: DISCONTINUED | OUTPATIENT
Start: 2019-03-26 | End: 2019-03-26 | Stop reason: HOSPADM

## 2019-03-26 RX ORDER — GUAIFENESIN 100 MG/5ML
81 LIQUID (ML) ORAL DAILY
Status: DISCONTINUED | OUTPATIENT
Start: 2019-03-26 | End: 2019-03-26 | Stop reason: HOSPADM

## 2019-03-26 RX ORDER — OMEPRAZOLE 40 MG/1
40 CAPSULE, DELAYED RELEASE ORAL DAILY
Qty: 30 CAP | Refills: 0 | Status: SHIPPED | OUTPATIENT
Start: 2019-03-26

## 2019-03-26 RX ORDER — GUAIFENESIN 100 MG/5ML
81 LIQUID (ML) ORAL DAILY
Qty: 30 TAB | Refills: 0 | Status: SHIPPED | OUTPATIENT
Start: 2019-03-26

## 2019-03-26 RX ORDER — ATORVASTATIN CALCIUM 10 MG/1
10 TABLET, FILM COATED ORAL
Qty: 30 TAB | Refills: 0 | Status: SHIPPED | OUTPATIENT
Start: 2019-03-26 | End: 2019-05-02 | Stop reason: ALTCHOICE

## 2019-03-26 RX ORDER — CLOPIDOGREL BISULFATE 75 MG/1
75 TABLET ORAL DAILY
Qty: 30 TAB | Refills: 0 | Status: SHIPPED | OUTPATIENT
Start: 2019-03-26 | End: 2019-11-14

## 2019-03-26 RX ADMIN — Medication 10 ML: at 05:09

## 2019-03-26 RX ADMIN — ASPIRIN 81 MG CHEWABLE TABLET 81 MG: 81 TABLET CHEWABLE at 09:52

## 2019-03-26 RX ADMIN — METOPROLOL TARTRATE 25 MG: 25 TABLET ORAL at 05:09

## 2019-03-26 RX ADMIN — OXYCODONE AND ACETAMINOPHEN 1 TABLET: 5; 325 TABLET ORAL at 05:09

## 2019-03-26 RX ADMIN — FAMOTIDINE 20 MG: 20 TABLET ORAL at 08:41

## 2019-03-26 RX ADMIN — CLOPIDOGREL BISULFATE 75 MG: 75 TABLET ORAL at 09:52

## 2019-03-26 NOTE — PROGRESS NOTES
Spiritual Care Partner Volunteer visited patient in room 547 on 3.26.19. Documented by: : Rev. Yasmani Farr. Herlinda Canales; [facility], to contact [phone] [address]azo call: [phone]

## 2019-03-26 NOTE — DISCHARGE INSTRUCTIONS
DISCHARGE SUMMARY from Nurse    PATIENT INSTRUCTIONS:    After general anesthesia or intravenous sedation, for 24 hours or while taking prescription Narcotics:  · Limit your activities  · Do not drive and operate hazardous machinery  · Do not make important personal or business decisions  · Do  not drink alcoholic beverages  · If you have not urinated within 8 hours after discharge, please contact your surgeon on call. Report the following to your surgeon:  · Excessive pain, swelling, redness or odor of or around the surgical area  · Temperature over 100.5  · Nausea and vomiting lasting longer than 4 hours or if unable to take medications  · Any signs of decreased circulation or nerve impairment to extremity: change in color, persistent  numbness, tingling, coldness or increase pain  · Any questions    *  Please give a list of your current medications to your Primary Care Provider. *  Please update this list whenever your medications are discontinued, doses are      changed, or new medications (including over-the-counter products) are added. *  Please carry medication information at all times in case of emergency situations. These are general instructions for a healthy lifestyle:    No smoking/ No tobacco products/ Avoid exposure to second hand smoke  Surgeon General's Warning:  Quitting smoking now greatly reduces serious risk to your health. Obesity, smoking, and sedentary lifestyle greatly increases your risk for illness    A healthy diet, regular physical exercise & weight monitoring are important for maintaining a healthy lifestyle    You may be retaining fluid if you have a history of heart failure or if you experience any of the following symptoms:  Weight gain of 3 pounds or more overnight or 5 pounds in a week, increased swelling in our hands or feet or shortness of breath while lying flat in bed.   Please call your doctor as soon as you notice any of these symptoms; do not wait until your next office visit. Recognize signs and symptoms of STROKE:    F-face looks uneven    A-arms unable to move or move unevenly    S-speech slurred or non-existent    T-time-call 911 as soon as signs and symptoms begin-DO NOT go       Back to bed or wait to see if you get better-TIME IS BRAIN. Warning Signs of HEART ATTACK     Call 911 if you have these symptoms:   Chest discomfort. Most heart attacks involve discomfort in the center of the chest that lasts more than a few minutes, or that goes away and comes back. It can feel like uncomfortable pressure, squeezing, fullness, or pain.  Discomfort in other areas of the upper body. Symptoms can include pain or discomfort in one or both arms, the back, neck, jaw, or stomach.  Shortness of breath with or without chest discomfort.  Other signs may include breaking out in a cold sweat, nausea, or lightheadedness. Don't wait more than five minutes to call 911 - MINUTES MATTER! Fast action can save your life. Calling 911 is almost always the fastest way to get lifesaving treatment. Emergency Medical Services staff can begin treatment when they arrive -- up to an hour sooner than if someone gets to the hospital by car. The discharge information has been reviewed with the patient. The patient verbalized understanding. Discharge medications reviewed with the patient and appropriate educational materials and side effects teaching were provided. Tiltap Activation    Thank you for requesting access to Tiltap. Please follow the instructions below to securely access and download your online medical record. Tiltap allows you to send messages to your doctor, view your test results, renew your prescriptions, schedule appointments, and more. How Do I Sign Up? 1. In your internet browser, go to [email or web address]  2. Click on the First Time User? Click Here link in the Sign In box. You will be redirect to the New Member Sign Up page.   3. Enter your [employer] Access Code exactly as it appears below. You will not need to use this code after youve completed the sign-up process. If you do not sign up before the expiration date, you must request a new code. Second Porch Access Code: [email or web address]  Expires: 2019  9:36 AM (This is the date your Second Porch access code will )    4. Enter the last four digits of your Social Security Number (xxxx) and Date of Birth (mm/dd/yyyy) as indicated and click Submit. You will be taken to the next sign-up page. 5. Create a Second Porch ID. This will be your [employer] Porch login ID and cannot be changed, so think of one that is secure and easy to remember. 6. Create a [employer] Porch password. You can change your password at any time. 7. Enter your Password Reset Question and Answer. This can be used at a later time if you forget your password. 8. Enter your e-mail address. You will receive e-mail notification when new information is available in [address] 9. Click Sign Up. You can now view and download portions of your medical record. 10. Click the Download Summary menu link to download a portable copy of your medical information. Additional Information    If you have questions, please visit the Frequently Asked Questions section of the Second Porch website at [email or web address] Remember, Second Porch is NOT to be used for urgent needs. For medical emergencies, dial 911.        _____________________________________________________________________________________________________  ______________________________   Discharge Instructions       PATIENT ID: Seema Webb  MRN: [id number]   YOB: 1980    DATE OF ADMISSION: 3/24/2019  9:35 AM    DATE OF DISCHARGE: 3/26/2019    PRIMARY CARE PROVIDER: Xavi Scott MD     ATTENDING PHYSICIAN: Destin Ceron DO  DISCHARGING PROVIDER: [address] [place] [address]     To contact this individual call [phone] and ask the  to page.    If unavailable ask to be transferred the Adult Hospitalist Department. DISCHARGE DIAGNOSES     Chest pain    CONSULTATIONS: IP CONSULT TO CARDIOLOGY    PROCEDURES/SURGERIES: Procedure(s):  LEFT HEART CATH / CORONARY ANGIOGRAPHY    PENDING TEST RESULTS:   At the time of discharge the following test results are still pending: none    FOLLOW UP APPOINTMENTS:   Follow-up Information     Follow up With Specialties Details Why Contact Info    Tapan Cadet MD Family Practice  Please follow up within 1 week  Tonya Ring [address] [place] [address]      Jean Lopez MD Cardiology  Please call for hospital follow up appointment within 3-4 weeks [address]  [phone]             ADDITIONAL CARE RECOMMENDATIONS:     New medications:   1. Plavix: take once daily  for 1 month then stop  2. Aspirin: take once daily  3. Atorvastatin (cholesterol medication): take once daily   4. Omeprazole (acid reflux medication: take once daily     Please follow up with cardiology in 3-4 weeks    Please schedule appointment with primary care physician within 1 week of discharge. Recommend referral to gastroenterology. Return to the hospital for new or worsening symptoms    DIET: Cardiac Diet    ACTIVITY: Activity as tolerated    WOUND CARE: none    EQUIPMENT needed: none      DISCHARGE MEDICATIONS:   See Medication Reconciliation Form    · It is important that you take the medication exactly as they are prescribed. · Keep your medication in the bottles provided by the pharmacist and keep a list of the medication names, dosages, and times to be taken in your wallet. · Do not take other medications without consulting your doctor. NOTIFY YOUR PHYSICIAN FOR ANY OF THE FOLLOWING:   Fever over 101 degrees for 24 hours. Chest pain, shortness of breath, fever, chills, nausea, vomiting, diarrhea, change in mentation, falling, weakness, bleeding. Severe pain or pain not relieved by medications.   Or, any other signs or symptoms that you may have questions about. Signed:   Ciro Decker DO  3/26/2019  9:40 AM    Patient Education        Chest Pain: Care Instructions  Your Care Instructions    There are many things that can cause chest pain. Some are not serious and will get better on their own in a few days. But some kinds of chest pain need more testing and treatment. Your doctor may have recommended a follow-up visit in the next 8 to 12 hours. If you are not getting better, you may need more tests or treatment. Even though your doctor has released you, you still need to watch for any problems. The doctor carefully checked you, but sometimes problems can develop later. If you have new symptoms or if your symptoms do not get better, get medical care right away. If you have worse or different chest pain or pressure that lasts more than 5 minutes or you passed out (lost consciousness), call 911 or seek other emergency help right away. A medical visit is only one step in your treatment. Even if you feel better, you still need to do what your doctor recommends, such as going to all suggested follow-up appointments and taking medicines exactly as directed. This will help you recover and help prevent future problems. How can you care for yourself at home? · Rest until you feel better. · Take your medicine exactly as prescribed. Call your doctor if you think you are having a problem with your medicine. · Do not drive after taking a prescription pain medicine. When should you call for help? Call 911 if:    · You passed out (lost consciousness).     · You have severe difficulty breathing.     · You have symptoms of a heart attack. These may include:  ? Chest pain or pressure, or a strange feeling in your chest.  ? Sweating. ? Shortness of breath. ? Nausea or vomiting. ? Pain, pressure, or a strange feeling in your back, neck, jaw, or upper belly or in one or both shoulders or arms.   ? Lightheadedness or sudden weakness. ? A fast or irregular heartbeat. After you call 911, the  may tell you to chew 1 adult-strength or 2 to 4 low-dose aspirin. Wait for an ambulance. Do not try to drive yourself.    Call your doctor today if:    · You have any trouble breathing.     · Your chest pain gets worse.     · You are dizzy or lightheaded, or you feel like you may faint.     · You are not getting better as expected.     · You are having new or different chest pain. Where can you learn more? Go to http://donna-miguel.info/. Enter A120 in the search box to learn more about \"Chest Pain: Care Instructions. \"  Current as of: September 23, 2018  Content Version: 11.9  © 7925-4197 [employer]. Care instructions adapted under license by [employer] (which disclaims liability or warranty for this information). If you have questions about a medical condition or this instruction, always ask your healthcare professional. [place] [address] any warranty or liability for your use of this information.

## 2019-03-26 NOTE — DISCHARGE SUMMARY
Discharge Summary       PATIENT ID: Seema Webb  MRN: [id number]   YOB: 1980    DATE OF ADMISSION: 3/24/2019  9:35 AM    DATE OF DISCHARGE: 3/26/2019  PRIMARY CARE PROVIDER: Xavi Scott MD     ATTENDING PHYSICIAN: Arielle Ken DO   DISCHARGING PROVIDER: Arielle Ken DO    To contact this individual call [phone] and ask the  to page. If unavailable ask to be transferred the Adult Hospitalist Department. CONSULTATIONS: IP CONSULT TO CARDIOLOGY    PROCEDURES/SURGERIES: Procedure(s):  LEFT HEART CATH / CORONARY ANGIOGRAPHY    ADMITTING DIAGNOSES & HOSPITAL COURSE:     34year old male presenting with left shoulder pain and found to have elevated troponin. Cardiology consulted. Patient underwent cardiac catheterization and noted to have normal coronary arteries. Echo with normal EF. Unclear reason for elevated troponin. Patient initiated on plavix, aspirin. Instructed to follow up outpatient with cardiology and gastroenterology. He had persistent but as severe shoulder pain on discharge. He was given short course muscle relaxer and pain medication and instructed to follow up with primary care physician for further evaluation. Patient was educated to return to emergency department for new or worsening pain. He voiced understanding. Plan was also discussed via phone with his wife, Colt Burdick. CTA chest:  IMPRESSION:  1. No evidence of pulmonary embolus. 2.  Bibasilar atelectasis. 3. Diffuse fatty infiltration of the liver. Left heart cath:  Coronary Findings     Diagnostic   Dominance: Right   Left Main   The vessel was visualized by angiography. The vessel is angiographically normal.   Left Anterior Descending   There is mild diffuse disease. Left Circumflex   The vessel is angiographically normal.   Right Coronary Artery   The vessel is angiographically normal.   Intervention     No interventions have been documented.      Echo:  · Estimated left ventricular ejection fraction is 56 - 60%. DISCHARGE DIAGNOSES / PLAN:      NSTEMI, POA:  -[facility] with normal coronary arteries  -plavix x1 month, continue aspirin daily  -initiated on atorvastatin  -follow up with cardiology     Tachycardia and elevated BP, resolved:  -CTA chest negative     Fatty liver on CT with mild elevation of LFTs  -Patient drinks alcohol 4 times a week  -Advised against excessive alcohol intake     Obesity  -Body mass index is 31.88 kg/m². -Weight loss recommended    GERD  -initiated omeprazole, recommend outpatient GI referral        ADDITIONAL CARE RECOMMENDATIONS:   New medications:   1. Plavix: take once daily  for 1 month then stop  2. Aspirin: take once daily  3. Atorvastatin (cholesterol medication): take once daily   4. Omeprazole (acid reflux medication: take once daily     Please follow up with cardiology in 3-4 weeks    Please schedule appointment with primary care physician within 1 week of discharge. Recommend referral to gastroenterology. Return to the hospital for new or worsening symptoms      PENDING TEST RESULTS:   At the time of discharge the following test results are still pending: none    FOLLOW UP APPOINTMENTS:    Follow-up Information     Follow up With Specialties Details Why Contact Info    Byron Ponce MD [facility] On 3/29/2019 Hospital f/u PCP appointment Friday, 3/29/19 @ 3:30 p.m.  [address] [place] [address]      Rosemarie Grant MD Cardiology On 4/15/2019 12 noon  [address] [facility]  [phone]             DIET: Cardiac Diet    ACTIVITY: Activity as tolerated    WOUND CARE: none    EQUIPMENT needed: none    DISCHARGE MEDICATIONS:  Discharge Medication List as of 3/26/2019 10:59 AM      START taking these medications    Details   aspirin 81 mg chewable tablet Take 1 Tab by mouth daily. , Print, Disp-30 Tab, R-0      atorvastatin (LIPITOR) 10 mg tablet Take 1 Tab by mouth nightly. , Print, Disp-30 Tab, R-0 clopidogrel (PLAVIX) 75 mg tab Take 1 Tab by mouth daily. , Print, Disp-30 Tab, R-0      omeprazole (PRILOSEC) 40 mg capsule Take 1 Cap by mouth daily. , Print, Disp-30 Cap, R-0      oxyCODONE-acetaminophen (PERCOCET) 5-325 mg per tablet Take 1 Tab by mouth every four (4) hours as needed for Pain for up to 3 days. Max Daily Amount: 6 Tabs., Print, Disp-6 Tab, R-0      baclofen 5 mg tab Take 5 mg by mouth three (3) times daily as needed (muscle spasm). , Print, Disp-8 Tab, R-0         CONTINUE these medications which have NOT CHANGED    Details   Cetirizine (ZYRTEC) 10 mg cap Take 5 mg by mouth., Historical Med      multivitamin (ONE A DAY) tablet Take 1 Tab by mouth daily. , Historical Med         STOP taking these medications       raNITIdine (ZANTAC) 150 mg tablet Comments:   Reason for Stopping:                 NOTIFY YOUR PHYSICIAN FOR ANY OF THE FOLLOWING:   Fever over 101 degrees for 24 hours. Chest pain, shortness of breath, fever, chills, nausea, vomiting, diarrhea, change in mentation, falling, weakness, bleeding. Severe pain or pain not relieved by medications. Or, any other signs or symptoms that you may have questions about. DISPOSITION:  x  Home With:   OT  PT  HH  RN       Long term SNF/Inpatient Rehab    Independent/assisted living    Hospice    Other:       PATIENT CONDITION AT DISCHARGE:     Functional status    Poor     Deconditioned    x Independent      Cognition   x  Lucid     Forgetful     Dementia      Catheters/lines (plus indication)    [place]     PICC     PEG    x None      Code status   x  Full code     DNR      PHYSICAL EXAMINATION AT DISCHARGE:   General appearance: alert, cooperative, no distress, appears stated age  Head: Normocephalic, without obvious abnormality, atraumatic  Lungs: clear to auscultation bilaterally  Chest wall: no tenderness  Heart: regular rate and rhythm  Abdomen: soft, non-tender.  Bowel sounds normal. No masses,  no organomegaly  Extremities: extremities normal, atraumatic, no cyanosis or edema  Skin: Skin color, texture, turgor normal. No rashes or lesions  Neurologic: Grossly normal               CHRONIC MEDICAL DIAGNOSES:  Problem List as of 3/26/2019 Date Reviewed: 3/24/2019          Codes Class Noted - Resolved    NSTEMI (non-ST elevated myocardial infarction) [facility]) ICD-10-CM: I21.4  ICD-9-CM: 410.70  3/25/2019 - Present        * (Principal) Chest pain ICD-10-CM: R07.9  ICD-9-CM: 786.50  3/24/2019 - Present              Greater than 30 minutes were spent with the patient on counseling and coordination of care    Signed:   Joy Park DO  3/26/2019  1:39 PM

## 2019-03-26 NOTE — PROGRESS NOTES
3/26/2019   Julien López MD  [facility] [place] Tasha Sainz is a 34 y.o. male Comfortable Some shoulder pain in left side last night when came back from cath and got pain meds last night and this am 
No other complaints Denies , heart palpitations , increasing edema, pre-syncope or shortness of breath at rest  
No problems overnight, rhythm and hemodynamics stable -see vitals below Discussion/Plans/Recs NQMI with normal cors 
--may have had clot or stress induced  
--would treat with plavix with aspirin 81 mg  for one month and then continue aspirin indefinite  
--lipitor 10 mg daily Can stop beta blocker , since no documented spasm and pain appears to be musculoskeletal  Or GI , I dont plan on CCB or BB 
 
GI work up asap as OP recommended Fu with me in 3-4 weeks See PCP at OR Further w/u for pain per primary team 
 
  
 
Cardiac Studies/Hx: 
No specialty comments available. Patient Vitals for the past 12 hrs: 
 Temp Pulse Resp BP SpO2  
03/26/19 0415 98.1 °F (36.7 °C) 74 16 103/68 94 % 03/25/19 2330  80  112/72   
03/25/19 2049 98 °F (36.7 °C) 80 18 115/79 94 % History reviewed. No pertinent past medical history. ROS-pertinents  negative except as above  The pertinent portions of the medical history,physician and nursing notes, meds,vitals , labs and Ins/Outs,are reviewed in the electronic record. Results for orders placed or performed during the hospital encounter of 03/24/19 EKG, 12 LEAD, INITIAL Result Value Ref Range Ventricular Rate 107 BPM  
 Atrial Rate 107 BPM  
 P-R Interval 124 ms QRS Duration 90 ms Q-T Interval 344 ms QTC Calculation (Bezet) 459 ms Calculated P Axis 32 degrees Calculated R Axis -8 degrees Calculated T Axis 21 degrees Diagnosis Sinus tachycardia Marked ST abnormality, possible lateral subendocardial injury Some baseline artifact is present When compared with ECG of 24-MAR-2019 09:40, 
Lateral ST changes are now present Confirmed by Anne Negrete M.D., Samul Brilliant ([id number]) on 3/25/2019 9:24:36 AM 
  
  
Vitals:  
 03/25/19 7271 03/25/19 2049 03/25/19 2330 03/26/19 6380 BP: 132/85 115/79 112/72 103/68 BP 1 Location: Left arm BP Patient Position: At rest     
Pulse: 84 80 80 74 Resp: 18 18  16 Temp: 98.2 °F (36.8 °C) 98 °F (36.7 °C)  98.1 °F (36.7 °C) SpO2: 94% 94%  94% Weight:      
Height:      
 
 
Objective:  
 Physical Exam:  
Patient Vitals for the past 12 hrs: 
 Temp Pulse Resp BP SpO2  
03/26/19 0415 98.1 °F (36.7 °C) 74 16 103/68 94 % 03/25/19 2330  80  112/72   
03/25/19 2049 98 °F (36.7 °C) 80 18 115/79 94 % General:  alert, cooperative, no distress, appears stated age ENT, Neck:  no jvd Chest Wall: inspection normal - no chest wall deformities or tenderness, respiratory effort normal  
Lung: clear to auscultation bilaterally Heart:  normal rate, regular rhythm, normal S1, S2, no murmurs, rubs, clicks or gallops Abdomen: nondistended Extremities: extremities normal, atraumatic, no cyanosis or edema Last 24hr Input/Output: 
No intake or output data in the 24 hours ending 03/26/19 0840 Data Review:  
Recent Results (from the past 24 hour(s)) ECHO ADULT COMPLETE Collection Time: 03/25/19  9:34 AM  
Result Value Ref Range LA Volume 79.94 (A) 18 - 58 mL  
 LV E' Lateral Velocity 13.75 cm/s LV E' Septal Velocity 8.57 cm/s Tapse 2.02 (A) 1.5 - 2.0 cm Ao Root D 3.24 cm  
 LA Vol Index 32.91 16 - 28 ml/m2 TROPONIN I Collection Time: 03/25/19 11:35 AM  
Result Value Ref Range Troponin-I, Qt. 0.92 (H) <0.05 ng/mL HEPATIC FUNCTION PANEL Collection Time: 03/25/19 11:35 AM  
Result Value Ref Range Protein, total 7.5 6.4 - 8.2 g/dL Albumin 3.2 (L) 3.5 - 5.0 g/dL Globulin 4.3 (H) 2.0 - 4.0 g/dL A-G Ratio 0.7 (L) 1.1 - 2.2  Bilirubin, total 0.6 0.2 - 1.0 MG/DL  
 Bilirubin, direct 0.2 0.0 - 0.2 MG/DL Alk. phosphatase 67 45 - 117 U/L  
 AST (SGOT) 29 15 - 37 U/L  
 ALT (SGPT) 65 12 - 78 U/L  
CBC WITH AUTOMATED DIFF Collection Time: 03/26/19  4:20 AM  
Result Value Ref Range WBC 8.4 4.1 - 11.1 K/uL  
 RBC 4.62 4.10 - 5.70 M/uL  
 HGB 14.2 12.1 - 17.0 g/dL HCT 43.8 36.6 - 50.3 % MCV 94.8 80.0 - 99.0 FL  
 MCH 30.7 26.0 - 34.0 PG  
 MCHC 32.4 30.0 - 36.5 g/dL  
 RDW 12.1 11.5 - 14.5 % PLATELET 023 696 - 564 K/uL MPV 10.4 8.9 - 12.9 FL  
 NRBC 0.0 0  WBC ABSOLUTE NRBC 0.00 0.00 - 0.01 K/uL NEUTROPHILS 65 32 - 75 % LYMPHOCYTES 20 12 - 49 % MONOCYTES 13 5 - 13 % EOSINOPHILS 2 0 - 7 % BASOPHILS 0 0 - 1 % IMMATURE GRANULOCYTES 0 0.0 - 0.5 % ABS. NEUTROPHILS 5.3 1.8 - 8.0 K/UL  
 ABS. LYMPHOCYTES 1.7 0.8 - 3.5 K/UL  
 ABS. MONOCYTES 1.1 (H) 0.0 - 1.0 K/UL  
 ABS. EOSINOPHILS 0.2 0.0 - 0.4 K/UL  
 ABS. BASOPHILS 0.0 0.0 - 0.1 K/UL  
 ABS. IMM. GRANS. 0.0 0.00 - 0.04 K/UL  
 DF AUTOMATED METABOLIC PANEL, BASIC Collection Time: 03/26/19  4:20 AM  
Result Value Ref Range Sodium 137 136 - 145 mmol/L Potassium 4.0 3.5 - 5.1 mmol/L Chloride 106 97 - 108 mmol/L  
 CO2 25 21 - 32 mmol/L Anion gap 6 5 - 15 mmol/L Glucose 112 (H) 65 - 100 mg/dL BUN 16 6 - 20 MG/DL Creatinine 1.05 0.70 - 1.30 MG/DL  
 BUN/Creatinine ratio 15 12 - 20 GFR est AA >60 >60 ml/min/1.73m2 GFR est non-AA >60 >60 ml/min/1.73m2 Calcium 8.8 8.5 - 10.1 MG/DL Sherry Hernandez MD 3/26/2019

## 2019-03-26 NOTE — PROGRESS NOTES
Hospital follow-up PCP transitional care appointment has been scheduled with  Friday, 3/29/19 for 3:30 p.m. Pending patient discharge.   Kelli Irby, Care Management Specialist.

## 2019-03-27 ENCOUNTER — TELEPHONE (OUTPATIENT)
Dept: CARDIAC REHAB | Age: 39
End: 2019-03-27

## 2019-03-27 NOTE — TELEPHONE ENCOUNTER
3/27/2019 Cardiac Rehab: Called Mr. Joseph Reynolds  to discuss participation in the Cardiac Rehab Program following ? Non Q wave MI on 3/24/2019. Spoke with the pt. who has declined the program but plans to pursue a dietary consult through his employer. Dae Junior RN

## 2019-04-30 ENCOUNTER — TELEPHONE (OUTPATIENT)
Dept: CARDIOLOGY CLINIC | Age: 39
End: 2019-04-30

## 2019-05-02 ENCOUNTER — OFFICE VISIT (OUTPATIENT)
Dept: CARDIOLOGY CLINIC | Age: 39
End: 2019-05-02

## 2019-05-02 VITALS
BODY MASS INDEX: 31.33 KG/M2 | HEIGHT: 75 IN | OXYGEN SATURATION: 94 % | SYSTOLIC BLOOD PRESSURE: 113 MMHG | WEIGHT: 252 LBS | HEART RATE: 85 BPM | DIASTOLIC BLOOD PRESSURE: 79 MMHG

## 2019-05-02 DIAGNOSIS — K21.9 GASTROESOPHAGEAL REFLUX DISEASE, ESOPHAGITIS PRESENCE NOT SPECIFIED: ICD-10-CM

## 2019-05-02 DIAGNOSIS — I31.9 PERICARDITIS AS COMPLICATION OF ACUTE MYOCARDIAL INFARCTION (HCC): ICD-10-CM

## 2019-05-02 DIAGNOSIS — I21.4 NSTEMI (NON-ST ELEVATED MYOCARDIAL INFARCTION) (HCC): ICD-10-CM

## 2019-05-02 DIAGNOSIS — K76.0 FATTY LIVER: ICD-10-CM

## 2019-05-02 DIAGNOSIS — R07.2 SUBSTERNAL CHEST PAIN: Primary | ICD-10-CM

## 2019-05-02 DIAGNOSIS — I23.8 PERICARDITIS AS COMPLICATION OF ACUTE MYOCARDIAL INFARCTION (HCC): ICD-10-CM

## 2019-05-02 RX ORDER — ATORVASTATIN CALCIUM 10 MG/1
10 TABLET, FILM COATED ORAL
Qty: 90 TAB | Refills: 2 | Status: SHIPPED | OUTPATIENT
Start: 2019-05-02 | End: 2019-11-14

## 2019-05-02 NOTE — Clinical Note
5/24/19 Patient: Gera King YOB: 1980 Date of Visit: 5/2/2019 Faustino Dillard MD 
[address] [place] [address] VIA In Basket Dear Faustino Dillard MD, Thank you for referring Mr. Agusto Medina to [address] for evaluation. My notes for this consultation are attached. If you have questions, please do not hesitate to call me. I look forward to following your patient along with you.  
 
 
Sincerely, 
 
Trev Vernon MD

## 2019-05-02 NOTE — PROGRESS NOTES
Visit Vitals  /79 (BP 1 Location: Left arm, BP Patient Position: Sitting)   Pulse 85   Ht 6' 3\" (1.905 m)   Wt 252 lb (114.3 kg)   SpO2 94%   BMI 31.50 kg/m²

## 2019-05-02 NOTE — PROGRESS NOTES
Jossie Benitez Martell     1980       Sebastien Rowan MD, [facility] [place]  Date of Visit-5/2/2019   PCP is Aylin Montemayor MD   [facility] [place]  [facility]  HPI:  Nikolai Vaughn is a 34 y.o. male   Hospital f/u with elevated troponin with normal coronaries, CT showed no pulmonary embolism, EF 55 - 60%, presumed either spasm or clot, and Plavix for one month with ASA. Discharged 3/26. We stopped BB since no further BB and recommended GI workup, peak troponin was 3.24.  , LDL 77. Overall the pt states he is doing well. Pt feels normal again and does not feel physically limited. Pt does not get dizzy when standing up. Pt sprained his ankle a couple weeks ago but he's recovering. Pt was going to gym initially and plays soccer with his son but he's now not as active while recovering. Denies chest pain, edema, syncope or shortness of breath at rest, has no tachycardia, palpitations or sense of arrhythmia. EKG: SR WNL comparing to prior EKG 3/24 there's substantial change in the ST segment in Lead 2, 3, V5, V6. He may have had a mild pericarditis. Consider MRI if sx's later occur. Lab Results   Component Value Date/Time    Troponin-I, Qt. 0.92 (H) 03/25/2019 11:35 AM      Assessment/Plan:     1. Elevated Troponin and normal coronaries with normal LV function. Presumed clot or possible spasm no CP or ongoing spasm on plavix and ASA will restart lipitor 10 mg daily and would like to continue long term ASA and Lipitor     2. Fatty liver noted on scan f/u with GI had GERD with Prilosec than Xanax I will see him back in 6 months likely yearly after that. 3. Today's EKG showed SR WNL comparing to prior EKG 3/24 there's substantial change in the ST segment in Lead 2, 3, V5, V6. He may have had a mild pericarditis. Consider MRI if sx's later occur. Impression:   1. Substernal chest pain    2.  NSTEMI (non-ST elevated myocardial infarction) ([address]    3. Fatty liver    4. Gastroesophageal reflux disease, esophagitis presence not specified    5. Pericarditis as complication of acute myocardial infarction [facility])       Results for orders placed or performed during the hospital encounter of 03/24/19   EKG, 12 LEAD, INITIAL   Result Value Ref Range    Ventricular Rate 107 BPM    Atrial Rate 107 BPM    P-R Interval 124 ms    QRS Duration 90 ms    Q-T Interval 344 ms    QTC Calculation (Bezet) 459 ms    Calculated P Axis 32 degrees    Calculated R Axis -8 degrees    Calculated T Axis 21 degrees    Diagnosis       Sinus tachycardia  Marked ST abnormality, possible lateral subendocardial injury  Some baseline artifact is present  When compared with ECG of 24-MAR-2019 09:40,  Lateral ST changes are now present  Confirmed by Michelle Greco M.D., Brie Velazquez ([id number]) on 3/25/2019 9:24:36 AM        03/24/19   ECHO ADULT COMPLETE 03/25/2019 3/25/2019    Narrative · Estimated left ventricular ejection fraction is 56 - 60%. Signed by: Jonathan Salamanca MD        ROS-except as noted above. . A complete cardiac and respiratory are reviewed and negative except as above ; Resp-denies wheezing  or productive cough,. Const- No unusual weight loss or fever; Neuro-no recent seizure or CVA ; GI- No BRBPR, abdom pain, bloating ; - no  hematuria   see supplement sheet, initialed and to be scanned by staff  History reviewed. No pertinent past medical history. Social Hx= reports that he has quit smoking. He has never used smokeless tobacco. He reports that he drinks alcohol. He reports that he does not use drugs. Exam and Labs:  /79 (BP 1 Location: Left arm, BP Patient Position: Sitting)   Pulse 85   Ht 6' 3\" (1.905 m)   Wt 252 lb (114.3 kg)   SpO2 94%   BMI 31.50 kg/m² Constitutional:  NAD, comfortable  Head: NC,AT. Eyes: No scleral icterus. Neck:  Neck supple. No JVD present. Throat: moist mucous membranes.   Chest: Effort normal & normal respiratory excursion .Neurological: alert, conversant and oriented . Skin: Skin is not cold. No obvious systemic rash noted. Not diaphoretic. No erythema. Psychiatric:  Grossly normal mood and affect. Behavior appears normal. Extremities:  no clubbing or cyanosis. Abdomen: non distended    Lungs:breath sounds normal. No stridor. distress, wheezes or  Rales. Heart: normal rate, regular rhythm, normal S1, S2, no murmurs, rubs, clicks or gallops , PMI non displaced. Edema: Edema is none. Lab Results   Component Value Date/Time    Cholesterol, total 153 03/25/2019 03:55 AM    HDL Cholesterol 45 03/25/2019 03:55 AM    LDL, calculated 77.4 03/25/2019 03:55 AM    Triglyceride 153 (H) 03/25/2019 03:55 AM    CHOL/HDL Ratio 3.4 03/25/2019 03:55 AM     Lab Results   Component Value Date/Time    Sodium 137 03/26/2019 04:20 AM    Potassium 4.0 03/26/2019 04:20 AM    Chloride 106 03/26/2019 04:20 AM    CO2 25 03/26/2019 04:20 AM    Anion gap 6 03/26/2019 04:20 AM    Glucose 112 (H) 03/26/2019 04:20 AM    BUN 16 03/26/2019 04:20 AM    Creatinine 1.05 03/26/2019 04:20 AM    BUN/Creatinine ratio 15 03/26/2019 04:20 AM    GFR est AA >60 03/26/2019 04:20 AM    GFR est non-AA >60 03/26/2019 04:20 AM    Calcium 8.8 03/26/2019 04:20 AM      Wt Readings from Last 3 Encounters:   05/02/19 252 lb (114.3 kg)   03/25/19 254 lb (115.2 kg)      BP Readings from Last 3 Encounters:   05/02/19 113/79   03/26/19 107/75      Current Outpatient Medications   Medication Sig    atorvastatin (LIPITOR) 10 mg tablet Take 1 Tab by mouth nightly.  aspirin 81 mg chewable tablet Take 1 Tab by mouth daily.  clopidogrel (PLAVIX) 75 mg tab Take 1 Tab by mouth daily.  omeprazole (PRILOSEC) 40 mg capsule Take 1 Cap by mouth daily.  baclofen 5 mg tab Take 5 mg by mouth three (3) times daily as needed (muscle spasm).  Cetirizine (ZYRTEC) 10 mg cap Take 5 mg by mouth.  multivitamin (ONE A DAY) tablet Take 1 Tab by mouth daily.      No current facility-administered medications for this visit. Impression see above.       Written by Genevieve Dubose, as dictated by Tirso Calhoun MD.

## 2019-11-14 ENCOUNTER — OFFICE VISIT (OUTPATIENT)
Dept: CARDIOLOGY CLINIC | Age: 39
End: 2019-11-14

## 2019-11-14 VITALS
HEIGHT: 75 IN | OXYGEN SATURATION: 98 % | HEART RATE: 69 BPM | TEMPERATURE: 97.8 F | DIASTOLIC BLOOD PRESSURE: 74 MMHG | BODY MASS INDEX: 31.08 KG/M2 | WEIGHT: 250 LBS | RESPIRATION RATE: 16 BRPM | SYSTOLIC BLOOD PRESSURE: 116 MMHG

## 2019-11-14 DIAGNOSIS — I23.8 PERICARDITIS AS COMPLICATION OF ACUTE MYOCARDIAL INFARCTION (HCC): ICD-10-CM

## 2019-11-14 DIAGNOSIS — I21.4 NSTEMI (NON-ST ELEVATED MYOCARDIAL INFARCTION) (HCC): Primary | ICD-10-CM

## 2019-11-14 DIAGNOSIS — K76.0 FATTY LIVER: ICD-10-CM

## 2019-11-14 DIAGNOSIS — I31.9 PERICARDITIS AS COMPLICATION OF ACUTE MYOCARDIAL INFARCTION (HCC): ICD-10-CM

## 2019-11-14 NOTE — PROGRESS NOTES
Diaz José Luisvilma Martell     1980       Sebastien Rowan MD, [facility] [place]  Date of Visit-11/14/2019   PCP is Beverly Winkler MD   [facility] [place]  [facility]  HPI:  Minda Pathak is a 37 y.o. male   F/u with elevated troponin with normal coronaries, CT showed no pulmonary embolism, EF 55 - 60%, presumed either spasm or clot, and Plavix for one month with ASA. Discharged 3/26. We stopped BB since no further BB and recommended GI workup, peak troponin was 3.24.  , LDL 77. At last visit we restarted Lipitor and the plan is to continue Lipitor and ASA. His initial EKG 3/24 showed significant ST changes in lead 2, 3, V5, V6. Overall the pt states he is doing well. Pt is no longer taking Lipitor. Pt's PCP or his gastroenterologist asked him to stop taking it. Pt believes they told him to stop taking it secondary to fatty liver, but is not completely sure. Pt believes he saw Dr. Alexus Joseph. When he previously had chest pain before going to the ER in the past, he states that the pain would improve when he leaned forward and would worsen if he laid back. Denies chest pain, edema, syncope or shortness of breath at rest, has no tachycardia, palpitations or sense of arrhythmia. Assessment/Plan:     Patient Instructions   We will see you back in one year. Key CAD CHF Meds             aspirin 81 mg chewable tablet (Taking) Take 1 Tab by mouth daily. 1. NSTEMI (non-ST elevated myocardial infarction) ([address]  Pt had an event. We suspected it was a myopericarditis. Fortunately his cath was normal. I would continue ASA long term in case of spasm or clot but I suspect the chances of recurrence are low. 2. Pericarditis as complication of acute myocardial infarction [facility])  We went over the previous sx's and they do fit with a positional element of chest pain. 3. Fatty liver  Was followed by GI. He says they had him stop his Lipitor.    Not sure if that was reason, will try get records      F/u in 1 year, then likely PRN  Future Appointments   Date Time Provider Velvet Stuarti   11/19/2020  3:20 PM MD hans Liang LEX JAMIA         Impression:   1. NSTEMI (non-ST elevated myocardial infarction) ([facility]    2. Pericarditis as complication of acute myocardial infarction ([facility]    3. Fatty liver       Cardiac History:   No specialty comments available. ROS-except as noted above. . A complete cardiac and respiratory are reviewed and negative except as above ; Resp-denies wheezing  or productive cough,. Const- No unusual weight loss or fever; Neuro-no recent seizure or CVA ; GI- No BRBPR, abdom pain, bloating ; - no  hematuria   see supplement sheet, initialed and to be scanned by staff    Social Hx= reports that he has quit smoking. He has never used smokeless tobacco. He reports that he drinks alcohol. He reports that he does not use drugs. Exam and Labs:  /74 (BP 1 Location: Left arm, BP Patient Position: Sitting)   Pulse 69   Temp 97.8 °F (36.6 °C)   Resp 16   Ht 6' 3\" (1.905 m)   Wt 250 lb (113.4 kg)   SpO2 98%   BMI 31.25 kg/m² Constitutional:  NAD, comfortable  Head: NC,AT. Eyes: No scleral icterus. Neck:  Neck supple. No JVD present. Throat: moist mucous membranes. Chest: Effort normal & normal respiratory excursion . Neurological: alert, conversant and oriented . Skin: Skin is not cold. No obvious systemic rash noted. Not diaphoretic. No erythema. Psychiatric:  Grossly normal mood and affect. Behavior appears normal. Extremities:  no clubbing or cyanosis. Abdomen: non distended    Lungs:breath sounds normal. No stridor. distress, wheezes or  Rales. Heart: normal rate, regular rhythm, normal S1, S2, no murmurs, rubs, clicks or gallops , PMI non displaced. Edema: Edema is none.   Lab Results   Component Value Date/Time    Cholesterol, total 153 03/25/2019 03:55 AM    HDL Cholesterol 45 03/25/2019 03:55 AM    LDL, calculated 77.4 03/25/2019 03:55 AM    Triglyceride 153 (H) 03/25/2019 03:55 AM    CHOL/HDL Ratio 3.4 03/25/2019 03:55 AM     Lab Results   Component Value Date/Time    Sodium 137 03/26/2019 04:20 AM    Potassium 4.0 03/26/2019 04:20 AM    Chloride 106 03/26/2019 04:20 AM    CO2 25 03/26/2019 04:20 AM    Anion gap 6 03/26/2019 04:20 AM    Glucose 112 (H) 03/26/2019 04:20 AM    BUN 16 03/26/2019 04:20 AM    Creatinine 1.05 03/26/2019 04:20 AM    BUN/Creatinine ratio 15 03/26/2019 04:20 AM    GFR est AA >60 03/26/2019 04:20 AM    GFR est non-AA >60 03/26/2019 04:20 AM    Calcium 8.8 03/26/2019 04:20 AM      Wt Readings from Last 3 Encounters:   11/14/19 250 lb (113.4 kg)   05/02/19 252 lb (114.3 kg)   03/25/19 254 lb (115.2 kg)      BP Readings from Last 3 Encounters:   11/14/19 116/74   05/02/19 113/79   03/26/19 107/75      Current Outpatient Medications   Medication Sig    aspirin 81 mg chewable tablet Take 1 Tab by mouth daily.  omeprazole (PRILOSEC) 40 mg capsule Take 1 Cap by mouth daily.  Cetirizine (ZYRTEC) 10 mg cap Take 5 mg by mouth as needed.  multivitamin (ONE A DAY) tablet Take 1 Tab by mouth daily. No current facility-administered medications for this visit. Impression see above.       Written by Cecelia Welsh, as dictated by Viji Jacinto MD.

## 2019-11-14 NOTE — Clinical Note
11/14/19 Patient: Darian Roach YOB: 1980 Date of Visit: 11/14/2019 Ruba Hanson MD 
[address] [id number] VIA In Basket Dear Ruba Hanson MD, Thank you for referring Mr. Sherman Griffith to [address] for evaluation. My notes for this consultation are attached. If you have questions, please do not hesitate to call me. I look forward to following your patient along with you.  
 
 
Sincerely, 
 
Steven Almeida MD

## 2019-11-14 NOTE — PROGRESS NOTES
Visit Vitals  /74 (BP 1 Location: Left arm, BP Patient Position: Sitting)   Pulse 69   Temp 97.8 °F (36.6 °C)   Resp 16   Ht 6' 3\" (1.905 m)   Wt 250 lb (113.4 kg)   SpO2 98%   BMI 31.25 kg/m²

## 2019-11-17 PROBLEM — I31.9 PERICARDITIS AS COMPLICATION OF ACUTE MYOCARDIAL INFARCTION (HCC): Status: ACTIVE | Noted: 2019-11-17

## 2019-11-17 PROBLEM — R07.9 CHEST PAIN: Status: RESOLVED | Noted: 2019-03-24 | Resolved: 2019-11-17

## 2019-11-17 PROBLEM — K76.0 FATTY LIVER: Status: ACTIVE | Noted: 2019-11-17

## 2019-11-17 PROBLEM — I23.8 PERICARDITIS AS COMPLICATION OF ACUTE MYOCARDIAL INFARCTION (HCC): Status: ACTIVE | Noted: 2019-11-17

## 2020-11-20 ENCOUNTER — OFFICE VISIT (OUTPATIENT)
Dept: CARDIOLOGY CLINIC | Age: 40
End: 2020-11-20
Payer: COMMERCIAL

## 2020-11-20 VITALS
WEIGHT: 242 LBS | OXYGEN SATURATION: 98 % | BODY MASS INDEX: 30.09 KG/M2 | SYSTOLIC BLOOD PRESSURE: 110 MMHG | HEIGHT: 75 IN | HEART RATE: 70 BPM | RESPIRATION RATE: 20 BRPM | DIASTOLIC BLOOD PRESSURE: 70 MMHG

## 2020-11-20 DIAGNOSIS — K76.0 FATTY LIVER: ICD-10-CM

## 2020-11-20 DIAGNOSIS — I23.8 PERICARDITIS AS COMPLICATION OF ACUTE MYOCARDIAL INFARCTION (HCC): ICD-10-CM

## 2020-11-20 DIAGNOSIS — I31.9 PERICARDITIS AS COMPLICATION OF ACUTE MYOCARDIAL INFARCTION (HCC): ICD-10-CM

## 2020-11-20 DIAGNOSIS — I21.4 NSTEMI (NON-ST ELEVATED MYOCARDIAL INFARCTION) (HCC): Primary | ICD-10-CM

## 2020-11-20 PROCEDURE — 93000 ELECTROCARDIOGRAM COMPLETE: CPT | Performed by: SPECIALIST

## 2020-11-20 PROCEDURE — 99213 OFFICE O/P EST LOW 20 MIN: CPT | Performed by: SPECIALIST

## 2020-11-20 NOTE — PROGRESS NOTES
Visit Vitals  /70 (BP 1 Location: Left arm, BP Patient Position: Sitting)   Pulse 70   Resp 20   Ht 6' 3\" (1.905 m)   Wt 242 lb (109.8 kg)   SpO2 98%   BMI 30.25 kg/m²

## 2020-11-20 NOTE — PROGRESS NOTES
Lesly Martell     1980       Sebastien Rowan MD, [facility] [place]  Date of Visit-11/20/2020   PCP is Swapnil Farley MD   [facility] [place]  [facility]  HPI:  Kane García is a 41 y.o. male   1 year f/u with elevated troponin with normal coronaries, CT showed no pulmonary embolism, EF 55 - 60%, presumed either spasm or clot, and Plavix for one month with ASA.  Discharged 3/26.    We stopped BB since no further BB and recommended GI workup, peak troponin was 3.24.  , LDL 77. In May 2019 we restarted Lipitor and the plan is to continue Lipitor and ASA. His initial EKG 3/24 showed significant ST changes in lead 2, 3, V5, V6. Overall the pt states he is doing well. Pt states he has had no new events or problems. Pt denies any family hx of heart disease. Denies chest pain, edema, syncope or shortness of breath at rest, has no tachycardia, palpitations or sense of arrhythmia. EKG: SR WNL normal axis and intervals     Assessment/Plan:     1. NSTEMI (non-ST elevated myocardial infarction) ([address]  Pt with prior normal cath, elevated troponin. Found to have pericarditis. In the interim year he has had no sx's and feels well. I told him this is unlikely to recur. He can stop ASA we will see him back as needed. - AMB POC EKG ROUTINE W/ 12 LEADS, INTER & REP    2. Pericarditis as complication of acute myocardial infarction [facility])  --previously substantial change in the ST segment in Lead 2, 3, V5, V6. He may have had a mild pericarditis. Consider MRI if sx's later occur. 3. Fatty liver  noted on scan f/u with GI had GERD with Prilosec      F/u PRN  Patient Instructions   If you develop any chest pain or shortness of breath please call our office; otherwise, follow up as needed. No future appointments. 03/24/19   ECHO ADULT COMPLETE 03/25/2019 3/25/2019    Narrative · Estimated left ventricular ejection fraction is 56 - 60%.         Signed by: Jael Dill MD          Impression:   1. NSTEMI (non-ST elevated myocardial infarction) ([facility]    2. Pericarditis as complication of acute myocardial infarction ([facility]    3. Fatty liver       Cardiac History:   No specialty comments available. ROS-except as noted above. . A complete cardiac and respiratory are reviewed and negative except as above ; Resp-denies wheezing  or productive cough,. Const- No unusual weight loss or fever; Neuro-no recent seizure or CVA ; GI- No BRBPR, abdom pain, bloating ; - no  hematuria   see supplement sheet, initialed and to be scanned by staff  Past Medical History:   Diagnosis Date    Fatty liver 11/17/2019    NSTEMI (non-ST elevated myocardial infarction) ([facility] 3/25/2019    Pericarditis as complication of acute myocardial infarction ([facility] 11/17/2019      Social Hx= reports that he has quit smoking. He has never used smokeless tobacco. He reports current alcohol use. He reports that he does not use drugs. Exam and Labs:  /70 (BP 1 Location: Left arm, BP Patient Position: Sitting)   Pulse 70   Resp 20   Ht 6' 3\" (1.905 m)   Wt 242 lb (109.8 kg)   SpO2 98%   BMI 30.25 kg/m² Constitutional:  NAD, comfortable  Head: NC,AT. Eyes: No scleral icterus. Neck:  Neck supple. No JVD present. Throat: moist mucous membranes. Chest: Effort normal & normal respiratory excursion . Neurological: alert, conversant and oriented . Skin: Skin is not cold. No obvious systemic rash noted. Not diaphoretic. No erythema. Psychiatric:  Grossly normal mood and affect. Behavior appears normal. Extremities:  no clubbing or cyanosis. Abdomen: non distended    Lungs:breath sounds normal. No stridor. distress, wheezes or  Rales. Heart: normal rate, regular rhythm, normal S1, S2, no murmurs, rubs, clicks or gallops , PMI non displaced. Edema: Edema is none.   Lab Results   Component Value Date/Time    Cholesterol, total 153 03/25/2019 03:55 AM    HDL Cholesterol 45 03/25/2019 03:55 AM    LDL, calculated 77.4 03/25/2019 03:55 AM    Triglyceride 153 (H) 03/25/2019 03:55 AM    CHOL/HDL Ratio 3.4 03/25/2019 03:55 AM     Lab Results   Component Value Date/Time    Sodium 137 03/26/2019 04:20 AM    Potassium 4.0 03/26/2019 04:20 AM    Chloride 106 03/26/2019 04:20 AM    CO2 25 03/26/2019 04:20 AM    Anion gap 6 03/26/2019 04:20 AM    Glucose 112 (H) 03/26/2019 04:20 AM    BUN 16 03/26/2019 04:20 AM    Creatinine 1.05 03/26/2019 04:20 AM    BUN/Creatinine ratio 15 03/26/2019 04:20 AM    GFR est AA >60 03/26/2019 04:20 AM    GFR est non-AA >60 03/26/2019 04:20 AM    Calcium 8.8 03/26/2019 04:20 AM      Wt Readings from Last 3 Encounters:   11/20/20 242 lb (109.8 kg)   11/14/19 250 lb (113.4 kg)   05/02/19 252 lb (114.3 kg)      BP Readings from Last 3 Encounters:   11/20/20 110/70   11/14/19 116/74   05/02/19 113/79      Current Outpatient Medications   Medication Sig    aspirin 81 mg chewable tablet Take 1 Tab by mouth daily.  omeprazole (PRILOSEC) 40 mg capsule Take 1 Cap by mouth daily.  Cetirizine (ZYRTEC) 10 mg cap Take 5 mg by mouth as needed.  multivitamin (ONE A DAY) tablet Take 1 Tab by mouth daily. No current facility-administered medications for this visit. Impression see above.       Written by Deonna Rodriguez, as dictated by Edgard Reeves MD.

## 2020-11-20 NOTE — Clinical Note
11/20/20 Patient: Zuleyma Mireles YOB: 1980 Date of Visit: 11/20/2020 Fritz Aguero MD 
[address] [id number] VIA Facsimile: [phone] Dear Fritz Aguero MD, Thank you for referring Mr. Cora Rothman to [address] [place] [address] for evaluation. My notes for this consultation are attached. If you have questions, please do not hesitate to call me. I look forward to following your patient along with you.  
 
 
Sincerely, 
 
Ly Cueva MD

## 2020-11-20 NOTE — PATIENT INSTRUCTIONS
If you develop any chest pain or shortness of breath please call our office; otherwise, follow up as needed.

## 2023-08-01 NOTE — ADT AUTH CERT NOTES
Chest Pain - Care Day 2 (3/25/2019) by Sebastian Muse RN  
 
   
Review Entered Review Status 3/25/2019 15:14 Completed  
   
Criteria Review Care Day: 2 Care Date: 3/25/2019 Level of Care:   
Guideline Day 2 Level Of Care (X) ICU, intermediate, or telemetry care to discharge 3/25/2019 15:12:33 EDT by Lakia Choi   
telemetry Clinical Status  
(X) * Hemodynamic stability 3/25/2019 15:12:33 EDT by Lakia Choi 98.7, HR 78, RR 24, /66, 97% on NC at 2L   
  
(X) * Acute coronary syndrome ruled out 3/25/2019 15:12:33 EDT by Lakia Choi   
cath today, no significant epicardial CAD, normal LVEF,   
  
(X) * Pain absent or managed 3/25/2019 15:12:33 EDT by Lakia Choi   
absent at this time   
  
(X) * Dangerous arrhythmia absent 3/25/2019 15:12:33 EDT by Lakia Choi   
NSR   
  
(X) * No identification of etiology of pain requiring inpatient care ( ) * Discharge plans and education understood Activity ( ) * Ambulatory 3/25/2019 15:12:34 EDT by Lakia Choi   
post cath completed bedrest   
  
  
Routes  
(X) * Oral hydration, medications, and diet 3/25/2019 15:12:34 EDT by Lakia Choi Pepcid 27KO PO 2x daily, folic acid 1mg PO daily, Lopressor 25mg PO q6h, Morphine 2mg IV q4h PRN, Thera-M with iron PO daily, crestor 20mg PO qhs, Thiamine 100mg PO daily, heparin 4,000units IV x 1, NS at 25ml/HR IV cont, Fentanyl 50mcg x 1, 25mcg x2 Interventions (X) Possible stress test or cardiac catheterization 3/25/2019 15:12:34 EDT by Lakia Choi Pre-procedure Diagnosis: NSTEMI Post-procedure Diagnosis: Non-cardiac Chest Pain Procedure: Left Heart Cath Medications (X) Possible aspirin or cardiac medications 3/25/2019 15:12:34 EDT by Lakia Choi ASA 325mg PO daily 3/25/2019 15:14:47 EDT by Lakia Choi Subject: Additional Clinical Information 3/25/2019 Labs: PTT 64.2, Na 134, Glucose 110, Calcium 8,4, albumin 3.2, Globulin 4.3, Triglyceride 153, troponin 3.24 Echo: estimated left ventricular EF is 56-60%   
   
  
3/25/2019 15:12:33 EDT by Myrla Phalen Subject: Additional Clinical Information 3/25/2019 Pt had cardiac catherization today.   denies any chest pain post cath, cardiac enzymes trended up last anight and he was placed on heparin gtt, no fevers or chills, no nausea or vomiting. Pre-procedure Diagnosis: NSTEMI Post-procedure Diagnosis: Non-cardiac Chest Pain Procedure: Left Heart Cath Findings: no significant epicardial cad. Normal lvef. eitiology of elev trop unclear. Complications: None Meds: heparin 25,000 IV infusion continuous titrate, versed 2mg IV x 2, 1mg IV x 1 IM Plan: heparin gtt, NPO for cardiac cath today, ASA, statins, beta blockers, TTE pending, may need GI evaluation as outpatient. Heparin for DVT PPX. Cardiology plan: monitoring for bleeding from site post cath, monitor for chest pain   
   
  
  
  
  
* Milestone  
  
   
Chest Pain - Care Day 1 (3/24/2019) by Marilyn Benitez RN  
 
   
Review Entered Review Status 3/25/2019 09:20 Completed  
   
Criteria Review Care Day: 1 Care Date: 3/24/2019 Level of Care:   
Guideline Day 1 Level Of Care (X) Admit to ICU,[A]intermediate,[B]or telemetry care[C] 3/25/2019 09:20:32 EDT by Myrla Phalen   
telemetry Clinical Status  
(X) * Clinical Indications met[D] 3/25/2019 09:20:32 EDT by Myrla Phalen   
indications met Routes (X) Diet as tolerated 3/25/2019 09:20:32 EDT by Myrla Phalen   
NPO except meds (X) IV or oral hydration, medications 3/25/2019 09:20:32 EDT by Myrla Phalen NS IV bolus 1000ml x 1 Morphine 2mg IV q4PRN x 2 Pepcid 20mg IV x 1 Interventions (X) Cardiac biomarkers 3/25/2019 09:20:32 EDT by Myrla Phalen CK, 125, Troponin 0.06 and 1.07 (X) Arrange stress test, invasive procedures as needed 3/25/2019 09:20:32 EDT by Temo Lucio nuclear stress test, echo or cardiac cath in AM   
  
  
Medications (X) Possible IV nitroglycerin 3/25/2019 09:20:32 EDT by Elizabeth Perales Nitro infusion IV titirate continuous (X) Possible platelet inhibitors, anticoagulation 3/25/2019 09:20:32 EDT by Elizabeth Perales Hepartin 25,000 units IV titrate continuous ASA 162mg PO x 1 Heparin 4,000units IV x 1   
  
(X) Possible statin 3/25/2019 09:20:32 EDT by Elizabeth Perales Crestor 20mg PO qhs   
  
(X) Antihypertensive medication as needed to control blood pressure 3/25/2019 09:20:32 EDT by Elizabeth Perales Lopressor 25mg PO q6h   
  
  
3/25/2019 09:20:32 EDT by Elizabeth Perales Subject: Additional Clinical Information 3/24/2019 
  
35 y/o male with onset of pain in mid sternum along espohagus to right shoulder that started around 1230am on Saturday, Today, felt same symptoms hard to sleep for few hours, awoke with pain in right and left shoulders, went to [facility], first troponin was 0.06. Admitted or further work up for cardiac complaints Vitals: 98.7, , RR 23, /103, 98% on RA 
  
EKG: Sinus tach with moderate volatage criterea for LVH, may be normal variant CTA:   no evidence of pulmonary embolus, bibasilar atelectasis, diffuse fatty infiltration of the liver Labs: D dimer0.85, Glucose 101, Protein 8.3, Globulin 4.6, ALT 96, AST 46, , TSH 0.91 Meds: Pepcid 63IW PO 2x daily, Folic acid 1mg PO daily, Thera-M with iron PO daily, Percocet 5/325mg PO q6h PRN x 1, thiamine 100mg PO daily, GI cocktail 40ml PO x 1, IM Plan: R/O ACS, trend CE x 2, Check FLP, start statins, Lopressor for tachycardia and elevated BP, cardiology consult, NTG PRN, TTE ordered, place on CIWA, activity as tolerated, cardiac monitoring, I&Oâs, seizure precautions. Cardiology Plan: Stress nuclear and echo in AM if low enzymes, if enzymes positive then heparin and cath in AM. Second troponin is higher at 1.07, start NTG drip, heparin. May need CCU, plan for cath and PCI in AM.   
   
  
  
  
  
* Milestone  
  
   
Chest Pain - Clinical Indications for Admission to Inpatient Care by Cata Del Rio RN  
 
   
Review Entered Review Status 3/25/2019 08:58 Completed  
   
Criteria Review Clinical Indications for Admission to Inpatient Care Most Recent : [place] Most Recent Date: 3/25/2019 08:58:32 EDT  
(X) Admission is indicated for chest pain and1 or more of the following(1)(2)(3)(4)(5)(6):  
   (X) Angina or myocardial infarction. See Myocardial Infarction or Angina guideline.  
   3/25/2019 08:58:32 EDT by [place]  
difuse pain to shoulders and chest, now constant. EKG: sinus tacy moderate volatage criteria for LVH Notes:   
  
3/25/2019 08:58:32 EDT by [place] Subject: Additional Clinical Information 3/24/2019   
35 y/o male with onset of pain in mid sternum along espohagus to right shoulder that started around 1230am on Saturday, Today, felt same symptoms hard to sleep for few hours, awoke with pain in right and left shoulders, went to [facility] ED, first troponin was 0.06. Admitted or further work up for cardiac complaints Vitals: 98.7, , RR 23, /103, 98% on RA   
EKG: Sinus tach with moderate volatage criterea for LVH, may be normal variant Labs: D dimer0.85, Glucose 101, Protein 8.3, Globulin 4.6, ALT 96, AST 46, , Troponin 0.06, TSH 0.91   
   
  
3/25/2019 08:58:32 EDT by [place] Subject: Additional Clinical Information 3/24/2019   
35 y/o male with onset of pain in mid sternum along esophagus to right shoulder that started around 1230am Saturday nigh, hard to sleep. No previous cardiac history.   
   
  
  
  
H&P Notes  
 
 H&P by Annie Barney MD at [id number] documented on ED to Hosp-Admission (Discharged) from 3/24/2019 in [facility] [place]  Author: Annie Barney MD Author Type: Physician Filed: 19 1095 Note Status: Signed Cosign: Cosign Not Required Date of Service: 19 6264 : Annie Barney MD (Physician) Hospitalist History and Physical 
Katiuska Ordonez MD 
Answering service: [phone] from in house phone 
   
  
Date of Service:  3/24/2019 NAME:  Ralph Barlow :  1980 MRN:  [id number] Primary Care Provider: Diya Levine MD 
  
Chief Complaint:  
   
Chief Complaint Patient presents with  Abdominal Pain  
  
  
History of Present Illness:  
  
Ralph Barlow is a 34 y.o. male who presents with chest pain. Pt is AAOX3 and is able to provide history. Pt reports that he had a huge meal Friday night. He went to bed in usual state of health but woke up at 1230 am with chest pain which appeared to be worsening GERD like pain, he took tums and zantac with minimal improvement in pain, was up all night. Pain improved with sitting up and forward and worse with lying down. and as the time went by, his pain improved and did not recur. He had an event less day but on Saturday night, the similar symptoms recurred so [facility] went [facility] where he was given script for Nexium without much relief. Patient states that his pain did not improve, rather progressed with time and the pain type changed from GERD like to dull ache, now radiating to between shoulder blades and to left shoulder/neck along with anterior chest, pain was 8/10 on presentation to [facility], no associated SOB but did report pain with deep breathing. Pt reports no cough or sputum production.  patient was given Toradol at the [facility] where his pain improved to 3/10 and has been improving since. Patients blood work revealed almost normal work up with mild elevation of D-Dimer so CTPE was done which was neg for PE but did show atelectasis. EKG show NSR with early repol changes. Patient is afebrile, denies prior similar episodes. Denies cough or flu like symptoms. Denies NVD, no dysuria/hematuria. No prior cardiac history, lives sedentary life style but no recent long travels. 
  
Chart reviewed at length. Review of Systems: 
Pertinent positives noted in HPI. All other systems were reviewed and are negative. 
  
Past Medical and surgical history:  
Obesity GERD: uses Zantac PRN  
  
     
Past Surgical History:  
Procedure Laterality Date  HX VASECTOMY      
  2016  
  
  
Home medications: 
       
Prior to Admission medications Medication Sig Start Date End Date Taking? Authorizing Provider  
raNITIdine (ZANTAC) 150 mg tablet Take 150 mg by mouth two (2) times a day.     Yes Other, MD Anuradha  
Cetirizine (ZYRTEC) 10 mg cap Take 5 mg by mouth.     Yes Other, MD Anuradha  
multivitamin (ONE A DAY) tablet Take 1 Tab by mouth daily.     Yes Provider, Historical  
  
  
Allergies: 
No Known Allergies 
  
Family history:  
History reviewed. No FH of DM2, or premature CAD 
  
SOCIAL HISTORY: 
Patient resides at Home. Patient ambulates with out any device. Smoking history: reports that he has quit smoking. He has never used smokeless tobacco. 
Drug History:  reports that he does not use drugs. Alcohol history:  reports that he drinks alcohol. 4 times/week 
  
Objective:  
  
  
Physical Exam:  
Visit Vitals BP (!) 160/93 (BP 1 Location: Right arm, BP Patient Position: Sitting) Pulse (!) 108 Temp 98.7 °F (37.1 °C) Resp 18 Ht 6' 3\" (1.905 m) Wt 115.7 kg (255 lb 1.2 oz) SpO2 98% BMI 31.88 kg/m²  
  
General:  Alert, cooperative, no distress, appears stated age. obese [FreeTextEntry1] : This is a 38 year old  female who underwent prophylactic mastectomies in 3/2019 due to a family history of breast cancer.    She had a pregnancy and the right implant changed.  She saw Dr. Martino for a discussion of how to correct it, but she wanted to hold off on any surgery.  Her mother passed away  from fallopian tube cancer.  Her mother's genetic testing was negative,  (VUS in CARL and RAD51C). The patient has gone for genetic counseling. She also now follows with Dr. Mora. Head:  Normocephalic, without obvious abnormality, atraumatic. Eyes:  Conjunctivae/corneas clear  
     
Nose: Nares normal.   
Throat: Lips, mucosa, and tongue normal.   
Neck: Supple, symmetrical, trachea midline, no adenopathy, thyroid: no enlargement/tenderness/nodules, no carotid bruit and no JVD.  
     
Lungs:   Clear to auscultation bilaterally. Chest wall:  No tenderness or deformity. Heart:  Sinus tachycardia, S1, S2 normal, no murmur, click, rub or gallop. Abdomen:   Soft, non-tender. Bowel sounds normal. No masses,  No organomegaly.  
     
     
Extremities: Extremities normal, atraumatic, no cyanosis or edema. Pulses: 2+ and symmetric all extremities. Skin: Skin color, texture, turgor normal. No rashes or lesions Lymph nodes: Cervical, supraclavicular nodes normal.  
Neurologic: CNII-XII intact. Normal strength, sensation and reflexes throughout.  
  
  
ECG:  Reviewed by myself, normal sinus rhythm, early repolarization changes  
  
Laboratory and other diagnostic Data Review: All diagnostic labs and studies have been reviewed. 
  
Xr Chest Pa Lat 
  
Result Date: 3/24/2019 EXAM: XR CHEST PA LAT INDICATION: chest pain to left shoulder COMPARISON: None. FINDINGS: PA and lateral radiographs of the chest demonstrate slight basilar atelectasis/scarring. No focal airspace process is identified. . There is minimal blunting of the left CP angle laterally. The cardiac and mediastinal contours and pulmonary vascularity are normal. The bones and soft tissues are within normal limits.  
  
IMPRESSION: There is slight bibasilar atelectasis /scarring. No focal airspace process is identified. 
  
Cta Chest W Or W Wo Cont 
  
Result Date: 3/24/2019 INDICATION:   chest pain, elevated d dimer, eval for PE please COMPARISON:  None TECHNIQUE:  Following the uneventful intravenous administration of 100 cc Isovue 506, thin helical axial images were obtained through the chest. Postprocessing was performed. 3D image postprocessing was performed. CT dose reduction was achieved through the use of a standardized protocol tailored for this examination and automatic exposure control for dose modulation. FINDINGS: There are no enlarged mediastinal or hilar lymph nodes. The heart size is normal.  There is no pericardial effusion. No filling defect is seen within the pulmonary arterial system to suggest pulmonary embolus. The aorta is normal in caliber. There is no focal air space disease. There is bibasilar atelectasis. No pulmonary nodule or mass is seen. There are no pleural effusions. Limited evaluation of the upper abdomen demonstrates diffuse fatty infiltration of the liver. The osseous structures are unremarkable.  
  
IMPRESSION: 1. No evidence of pulmonary embolus. 2.  Bibasilar atelectasis. 3. Diffuse fatty infiltration of the liver.  
  
  
Patient Vitals for the past 12 hrs: 
  Temp Pulse Resp BP SpO2  
03/24/19 1241 98.7 °F (37.1 °C) (!) 108 18 (!) 160/93   
03/24/19 1130 97.8 °F (36.6 °C) 86  (!) 157/113 98 % 03/24/19 1100  90  (!) 165/103 98 % 03/24/19 1027  85 23 (!) 151/100 98 % 03/24/19 0939  88 21 (!) 147/103    
  
   
Recent Labs  
  03/24/19 
8629 WBC 9.4 HGB 15.7 HCT 46.6   
  
   
Recent Labs  
  03/24/19 
0947   
K 4.6  CO2 29 BUN 13  
CREA 1.04 * CA 9.0  
  
   
Recent Labs  
  03/24/19 
7649 SGOT 46* ALT 96* AP 79 TBILI 0.9 TP 8.3* ALB 3.7 GLOB 4.6* LPSE 172  
  
No results for input(s): INR, PTP, APTT in the last 72 hours. 
  
No lab exists for component: INREXT No results for input(s): FE, TIBC, PSAT, FERR in the last 72 hours. No results found for: FOL, RBCF No results for input(s): PH, PCO2, PO2 in the last 72 hours. Recent Labs  
  03/24/19 
9933 TROIQ 0.06*  
  
No results found for: CHOL, CHOLX, CHLST, CHOLV, HDL, LDL, LDLC, DLDLP, TGLX, TRIGL, TRIGP, CHHD, CHHDX No results found for: Lemon Lie No results found for: COLOR, APPRN, SPGRU, REFSG, SKYLAR, PROTU, GLUCU, KETU, BILU, UROU, GEORGI, LEUKU, GLUKE, EPSU, BACTU, WBCU, RBCU, CASTS, UCRY 
  
Assessment:  
Given the patient's current clinical presentation, I have a high level of concern for decompensation if discharged from the emergency department.  Complex decision making was performed, which includes reviewing the patient's available past medical records, laboratory results, and x-ray films.    
  
My assessment of this patient's clinical condition and my plan of care is as follows. 
  
  
Principal Problem: 
  Chest pain (3/24/2019)   
  
  
Plan:  
  
- chest pain, POA 
R/o ACS 
trp x 1 neg EKG x 1 : early repolarization changes, will repeat EKG today Chest pain responded well to Toradol Trend CE x 3 Cont asa Check FLP, start statins if needed Start on Lopressor given tachycardia and high blood pressures Tele bed Cards consult NTG PRN 
TTE ordered to r/o pericardial effusion 
  
- Tachycardia and elevated BP 
CTPE neg for PE 
?anxiety or Toradol side effect Start on lopressor 
  
- Fatty liver on CT with mild elevation of LFTs Patient drinks alcohol 4 times a week Advised against excessive alcohol intake Will place on CIWA 
  
- Obesity Body mass index is 31.88 kg/m². Weight loss recommended Check HbA1c, TSH, Free T4 
  
- GERD Cont PEPCID May need GI eval, likely as outpatient 
  
Diet: Cardiac Diet Activity: Activity as tolerated DVT prophylaxis: none, low risk Isolation precautions: none Consultations: cards Anticipated disposition: TBD Code status: Full Code Patient is ambulatory PTA 
  
Place as an Observation  
  
Patient was explained about the risk of admission including and not a complete list including risk of falls,fractures,blood clots,allergic reactions,infections.  Patient/family also understands and agrees to the treatment plan including medications and side effect profiles and also understand the risk with radiation while undergoing imaging studies.  
  
The patient and the family/friends (after permission given by the patient to discuss) understand this and agree with the admission plan.  
  
  
Signed By: Brodie Brady MD   
  03/24/19 
1:33 PM

## (undated) DEVICE — KIT HND CTRL 3 W STPCOCK ROT END 54IN PREM HI PRSS TBNG AT

## (undated) DEVICE — ANGIOGRAPHY KIT

## (undated) DEVICE — KIT MED IMAG CNTRST AGNT W/ IOPAMIDOL REUSE

## (undated) DEVICE — KIT MFLD ISOLATN NACL CNTRST PRT TBNG SPIK W/ PRSS TRNSDUC

## (undated) DEVICE — PACK PROCEDURE SURG HRT CATH

## (undated) DEVICE — PINNACLE INTRODUCER SHEATH: Brand: PINNACLE

## (undated) DEVICE — ANGIOGRAPHIC CATHETER: Brand: IMPULSE™